# Patient Record
Sex: MALE | Race: AMERICAN INDIAN OR ALASKA NATIVE
[De-identification: names, ages, dates, MRNs, and addresses within clinical notes are randomized per-mention and may not be internally consistent; named-entity substitution may affect disease eponyms.]

---

## 2017-10-02 NOTE — HISTORY AND PHYSICAL REPORT
History of Present Illness


Date of admission: 


10/02/17 11:44





Chief complaint: 





I been having chest pain


History of present illness: 


35 YO Male with Obesity, Bipolar, Schizophrenia, Medication Noncompliance 

presents to ED for evaluation. Pt states that he has experienced pain in his 

chest. Pain began at 2000hrs while reclining in bed. Pain is 9/10, Sharp, 

constant, associated with shortness of breath, not worsened with exertion, or 

relieved with rest. Pt denies fever, chills, palpitations, productive cough, 

BRBPR, Syncope, NVD, unintentional weight loss, prolonged travel/immobility, 

Individual/Family History of DVT/PE, or recent ill contacts. 











Past History


Past Medical History: other (Obesity, Bipolar, Schizophrenia)


Past Surgical History: No surgical history, Other (reviewed)


Social history: single.  denies: smoking, alcohol abuse, prescription drug abuse


Family history: CAD, hypertension





Medications and Allergies


 Allergies











Allergy/AdvReac Type Severity Reaction Status Date / Time


 


No Known Allergies Allergy   Verified 10/01/17 22:30











 Home Medications











 Medication  Instructions  Recorded  Confirmed  Last Taken  Type


 


No Known Home Medications [No  10/02/17 10/02/17 Unknown History





Reported Home Medications]     














Review of Systems


Constitutional: no weight loss, no weight gain, no fever, no chills


Ears, nose, mouth and throat: no ear pain, no ear discharge, no tinnitis, no 

decreased hearing, no nose pain, no nasal congestion, no nasal discharge, no 

sinus pressure


Cardiovascular: chest pain, shortness of breath, no orthopnea, no palpitations, 

no rapid/irregular heart beat, no edema, no syncope, no lightheadedness


Respiratory: shortness of breath, no cough, no cough with sputum, no excessive 

sputum, no hemoptysis


Gastrointestinal: no abdominal pain, no nausea, no vomiting, no diarrhea, no 

constipation, no change in bowel habits


Genitourinary Male: no hematuria, no flank pain, no discharge, no urinary 

frequency, no urinary hesitancy, no nocturia, no incontinence, no erectile 

dysfunction


Rectal: no pain, no incontinence, no bleeding


Musculoskeletal: no neck stiffness, no neck pain, no shooting arm pain, no arm 

numbness/tingling, no low back pain, no shooting leg pain, no leg numbness/

tingling, no redness of joints


Integumentary: no rash, no pruritis, no redness, no sores, no wounds, no 

jaundice, no boils, no blisters


Neurological: no head injury, no transient paralysis, no paralysis, no weakness

, no parathesias, no numbness, no tingling, no seizures, no syncope, no tremors

, no ataxia, no lack of coordination


Psychiatric: no anxiety, no memory loss, no change in sleep habits, no sleep 

disturbances, no insomnia, no hypersomnia, no change in appetite, no change in 

libido


Endocrine: no cold intolerance, no heat intolerance, no polyphagia, no 

excessive thirst, no polydipsia, no polyuria, no nocturia, no excessive sweating


Hematologic/Lymphatic: no easy bruising, no easy bleeding


Allergic/Immunologic: no urticaria, no allergic rhinitis, no wheezing





Exam





- Constitutional


Vitals: 


 











Temp Pulse Resp BP Pulse Ox


 


 98.6 F   64   18   129/90   99 


 


 10/01/17 21:52  10/02/17 10:33  10/01/17 21:52  10/01/17 21:52  10/01/17 21:52











General appearance: Present: mild distress





- EENT


Eyes: Present: PERRL


ENT: hearing intact, clear oral mucosa





- Neck


Neck: Present: supple, normal ROM





- Respiratory


Respiratory effort: normal


Respiratory: bilateral: CTA





- Cardiovascular


Heart Sounds: Present: S1 & S2.  Absent: rub, click





- Extremities


Extremities: pulses symmetrical, No edema


Peripheral Pulses: within normal limits





- Abdominal


General gastrointestinal: Present: soft, non-tender, non-distended, normal 

bowel sounds


Male genitourinary: Present: normal





- Integumentary


Integumentary: Present: clear, warm, dry





- Musculoskeletal


Musculoskeletal: gait normal, strength equal bilaterally





- Psychiatric


Psychiatric: appropriate mood/affect, intact judgment & insight, agitated





- Neurologic


Neurologic: CNII-XII intact, moves all extremities





Results





- Labs


CBC & Chem 7: 


 10/01/17 22:09





 10/01/17 22:09





Assessment and Plan





- Patient Problems


(1) ACS (acute coronary syndrome)


Current Visit: Yes   Status: Acute   


Plan to address problem: 


Serial cardiac enzymes, ekg, telemetry, stress test, echo, cardiology consult, 

ddimer








(2) Obesity


Current Visit: Yes   Status: Acute   


Qualifiers: 


   Obesity type: O   Obesity classification: O   Serious obesity comorbidity 

presence: S   Body mass index: B 


Plan to address problem: 


balanced diet, increased physical activity, 








(3) GERD (gastroesophageal reflux disease)


Current Visit: Yes   Status: Acute   


Qualifiers: 


   Esophagitis presence: E 


Plan to address problem: 


PPI therapy,








(4) Bipolar 1 disorder


Current Visit: Yes   Status: Acute   


Plan to address problem: 


resume home medication, outpatient psychiatry F/U.








(5) DVT prophylaxis


Current Visit: Yes   Status: Acute

## 2017-10-02 NOTE — XRAY REPORT
AP CHEST:



HISTORY: chest pain



AP view of the chest demonstrates a normal mediastinal and

cardiac contour with clear lungs and normal bony and soft tissue

structures.



IMPRESSION:

Unremarkable AP chest.

## 2017-10-02 NOTE — CONSULTATION
History of Present Illness


Consult date: 10/02/17


Consult reason: chest pain


History of present illness: 





36yr old male who presented to the ED with complaints of chest pain. Chest pain 

reproducible with palpation. There is no shortness of breath or dizziness. He 

denies palpitations. ECG is a sinus rhythm with early repolarization 

abnormalities. Patient is admitted for rule out ACS and is planned for a 

thallium stress test ordered by the primary team. Cardiology consultation was 

requested.





Past History


Past Medical History: other (Bipolar, Schizophrenia)


Past Surgical History: No surgical history, Other (reviewed)


Social history: single.  denies: smoking, alcohol abuse, prescription drug abuse


Family history: CAD, hypertension





Medications and Allergies


 Allergies











Allergy/AdvReac Type Severity Reaction Status Date / Time


 


No Known Allergies Allergy   Verified 10/01/17 22:30











 Home Medications











 Medication  Instructions  Recorded  Confirmed  Last Taken  Type


 


No Known Home Medications [No  10/02/17 10/02/17 Unknown History





Reported Home Medications]     











Active Meds: 


Active Medications





Sodium Chloride (Sodium Chloride Flush Syringe 10 Ml)  10 ml IV PRN PRN


   PRN Reason: LINE FLUSH











Physical Examination


 Vital Signs











Temp Pulse Resp BP Pulse Ox


 


 98.6 F   56 L  18   129/90   98 


 


 10/01/17 21:49  10/01/17 21:49  10/01/17 21:49  10/01/17 21:49  10/01/17 21:49











General appearance: no acute distress


HEENT: Positive: PERRL


Neck: Positive: trachea midline


Cardiac: Positive: Reg Rate and Rhythm


Lungs: Positive: Decreased Breath Sounds


Neuro: Positive: Grossly Intact





Results





 10/01/17 22:09





 10/01/17 22:09





Assessment and Plan





- Patient Problems


(1) Chest pain


Current Visit: Yes   Status: Acute

## 2017-10-03 NOTE — TREADMILL REPORT
TREADMILL STRESS TEST REPORT



REASON FOR TEST:

Chest pain.



The patient exercised for 9 minutes of a Asim protocol, completing stage 3 and

achieving 10 mets.  Peak heart rate was 130 beats per minute.  The test was

stopped for fatigue.  There was no chest pain.



Baseline ECG was sinus rhythm.  With exercise, there were no ST changes of

ischemia.  No significant dysrhythmias were noted.



CONCLUSION:

1.  Good exercise capacity.

2.  No chest pain.

3.  No ST changes of ischemia.

4.  No significant dysrhythmias.



This is a negative exercise ECG test.





DD: 10/03/2017 10:04

DT: 10/03/2017 14:27

JOB# [id number]  [id number]

CA/JAYDEN

## 2017-10-03 NOTE — EVENT NOTE
Date: 10/03/17





Cardiac stress test performed today, patient exercised for 9 minutes of Asim 

protocol, no chest pain and no ST changes, negative stress test.


Echocardiogram done was also unremarkable, left ventricular ejection fraction 55

-60%, no significant valvular lesions.


Cardiac status is stable for discharge, patient should follow-up in our office 

within one week of discharge.

## 2017-10-03 NOTE — DISCHARGE SUMMARY
Providers





- Providers


Date of Admission: 


10/02/17 11:44





Date of discharge: 10/03/17


Attending physician: 


MEDHAT CRUZ





 





10/02/17


Consult to Cardiac Rehabilitation [CONS] Routine 


   Reason For Exam: Phase I





10/02/17 12:54


Consult to Cardiology [CONS] Routine 


   Consulting Provider: DIVYA RAO


   Reason For Exam: acs











Primary care physician: 


PRIMARY CARE MD








Hospitalization


Condition: Fair


Disposition: DC-01 TO HOME OR SELFCARE





- Discharge Diagnoses


(1) Musculoskeletal chest pain


Status: Acute   





Exam





- Constitutional


Vitals: 


 











Temp Pulse Resp BP Pulse Ox


 


 98.8 F   64   18   117/71   99 


 


 10/03/17 13:20  10/03/17 13:20  10/03/17 13:20  10/03/17 13:20  10/03/17 14:46














Plan


Activity: no restrictions


Diet: low fat, low cholesterol, low salt


Additional Instructions: 1.Follow up with PCP in 1 week


Follow up with: 


PRIMARY CARE,MD [Primary Care Provider] - 3-5 Days


Prescriptions: 


Ibuprofen [Motrin 400 MG tab] 400 mg PO Q8H PRN #20 tablet


 PRN Reason: Pain

## 2017-10-08 NOTE — EMERGENCY DEPARTMENT REPORT
ED General Adult HPI





- General


Chief complaint: Chest Pain


Stated complaint: CP; H/A


Time Seen by Provider: 10/08/17 06:22


Source: patient, RN notes reviewed, old records reviewed


Mode of arrival: Ambulatory


Limitations: No Limitations





- History of Present Illness


Initial comments: 





This is a 36-year-old male, patient is previously unknown to this provider, has 

a past medical history of obesity, bipolar, schizophrenia.  Patient recently 

admitted to the hospital for chest pain, had a negative exercise stress test, 

seen in consultation with cardiology, they recommended outpatient follow-up.  

Patient also has an echocardiogram demonstrated an appropriate ejection 

fraction.





During his previous hospitalization, there were no documented episodes of 

tachycardia.  The patient presents to the ER with 2 complaints.  His first 

complaint is chest pain.  Chest pain is central, it does not radiate to the back

, arms or neck.  There is no vomiting, there is no diaphoresis, there is no 

shortness of breath.  There is no leg pain, there is no leg swelling.  Patient 

indicates the chest pain has been on and off since the beginning of October.





The patient also complains of syncope/loss of consciousness.  He reports this 

was associated with a headache.  The headache is sudden in onset, and fairly 

rapid onset in nature.  The patient indicates he has not had a headache like 

this in the past.  He thinks is the worst headache of his life.  There is no 

neck pain.  There is no neck stiffness.  The patient is having difficulty 

constructing a cogent time line relating his possible syncope to his headache.  

He thinks they're closely associated, but he is not certain.























-: Gradual


Location: head, chest


Severity scale (0 -10): 8


Quality: aching


Consistency: intermittent


Improves with: none


Worsens with: none


Associated Symptoms: chest pain, headaches, syncope, weakness





- Related Data


 Previous Rx's











 Medication  Instructions  Recorded  Last Taken  Type


 


Famotidine [Pepcid] 20 mg PO BID #60 tablet 10/03/17 Unknown Rx


 


Ibuprofen [Motrin 400 MG tab] 400 mg PO Q8H PRN #20 tablet 10/03/17 Unknown Rx


 


Butalb/Acetamin/Caff -40 1 tab PO Q6HR PRN #15 tab 10/08/17 Unknown Rx





[Fioricet]    











 Allergies











Allergy/AdvReac Type Severity Reaction Status Date / Time


 


No Known Allergies Allergy   Verified 10/01/17 22:30














ED Review of Systems


ROS: 


Stated complaint: CP; H/A


Other details as noted in HPI





Constitutional: malaise.  denies: fever


Eyes: denies: vision change


ENT: denies: epistaxis


Respiratory: denies: cough


Cardiovascular: chest pain, syncope


Gastrointestinal: denies: abdominal pain


Genitourinary: denies: dysuria


Musculoskeletal: denies: back pain


Skin: denies: lesions


Neurological: headache


Psychiatric: denies: homicidal thoughts, suicidal thoughts





ED Past Medical Hx





- Past Medical History


Previous Medical History?: Yes


Hx Psychiatric Treatment:  (bipolar/schizo not taking meds)





- Surgical History


Past Surgical History?: No





- Social History


Smoking Status: Never Smoker


Substance Use Type: None





- Medications


Home Medications: 


 Home Medications











 Medication  Instructions  Recorded  Confirmed  Last Taken  Type


 


Famotidine [Pepcid] 20 mg PO BID #60 tablet 10/03/17  Unknown Rx


 


Ibuprofen [Motrin 400 MG tab] 400 mg PO Q8H PRN #20 tablet 10/03/17  Unknown Rx


 


Butalb/Acetamin/Caff -40 1 tab PO Q6HR PRN #15 tab 10/08/17  Unknown Rx





[Fioricet]     














ED Physical Exam





- General


Limitations: No Limitations


General appearance: alert, in no apparent distress





- Head


Head exam: Present: atraumatic, normocephalic





- Eye


Eye exam: Present: normal appearance, PERRL, EOMI, other (visual acuity intact 

to finger counting, color perception, reading at a close distance).  Absent: 

nystagmus





- ENT


ENT exam: Present: normal exam, normal orophraynx, mucous membranes moist, 

normal external ear exam





- Neck


Neck exam: Present: normal inspection, full ROM.  Absent: tenderness, 

meningismus





- Respiratory


Respiratory exam: Present: normal lung sounds bilaterally.  Absent: respiratory 

distress, wheezes, rales, rhonchi, stridor, chest wall tenderness, prolonged 

expiratory





- Cardiovascular


Cardiovascular Exam: Present: regular rate, normal rhythm, normal heart sounds.

  Absent: bradycardia, tachycardia, irregular rhythm, systolic murmur, 

diastolic murmur, rubs, gallop





- GI/Abdominal


GI/Abdominal exam: Present: soft, normal bowel sounds.  Absent: distended, 

tenderness, guarding, rebound, rigid, pulsatile mass





- Rectal


Rectal exam: Present: deferred





- Extremities Exam


Extremities exam: Present: normal inspection, full ROM, normal capillary 

refill.  Absent: pedal edema, joint swelling, calf tenderness





- Back Exam


Back exam: Present: normal inspection, full ROM.  Absent: tenderness, CVA 

tenderness (R), CVA tenderness (L), muscle spasm, paraspinal tenderness, 

vertebral tenderness





- Neurological Exam


Neurological exam: Present: alert, oriented X3, normal gait, other (Extraocular 

movements intact.  Tongue midline.  No facial droop.  Facial sensation intact 

to light touch in the V1, V2, V3 distribution bilaterally.  5 and 5 strength in 

4 extremities..  Sensation is intact to light touch in 4 extremities.).  Absent

: motor sensory deficit





- Psychiatric


Psychiatric exam: Present: normal affect, normal mood





- Skin


Skin exam: Present: warm, dry, intact, normal color.  Absent: rash





ED Course


 Vital Signs











  10/07/17 10/08/17 10/08/17





  23:51 00:15 04:35


 


Temperature 97.8 F 97.8 F 97.7 F


 


Pulse Rate 62 62 53 L


 


Respiratory 18 17 18





Rate   


 


Blood Pressure 124/72 124/72 


 


Blood Pressure   122/78





[Left]   


 


Blood Pressure   





[Right]   


 


O2 Sat by Pulse 98 99 98





Oximetry   














  10/08/17 10/08/17 10/08/17





  05:15 05:35 05:46


 


Temperature   


 


Pulse Rate 46 L 54 L 54 L


 


Respiratory 16 17 20





Rate   


 


Blood Pressure  118/80 118/80


 


Blood Pressure 119/75  





[Left]   


 


Blood Pressure   





[Right]   


 


O2 Sat by Pulse 98 94 95





Oximetry   














  10/08/17 10/08/17 10/08/17





  06:00 06:10 06:16


 


Temperature   


 


Pulse Rate 51 L 66 49 L


 


Respiratory 15  18





Rate   


 


Blood Pressure 118/80  107/70


 


Blood Pressure   





[Left]   


 


Blood Pressure   





[Right]   


 


O2 Sat by Pulse 99  96





Oximetry   














  10/08/17 10/08/17 10/08/17





  06:30 06:46 07:04


 


Temperature   


 


Pulse Rate 57 L 67 60


 


Respiratory 11 L  14





Rate   


 


Blood Pressure 107/70 107/70 107/70


 


Blood Pressure   





[Left]   


 


Blood Pressure   





[Right]   


 


O2 Sat by Pulse  97 95





Oximetry   














  10/08/17 10/08/17 10/08/17





  07:05 07:16 07:30


 


Temperature 98.3 F  


 


Pulse Rate 63 57 L 54 L


 


Respiratory 14 18 36 H





Rate   


 


Blood Pressure  122/77 122/77


 


Blood Pressure   





[Left]   


 


Blood Pressure 122/77  





[Right]   


 


O2 Sat by Pulse 95 98 97





Oximetry   














  10/08/17 10/08/17 10/08/17





  07:46 08:00 08:16


 


Temperature   


 


Pulse Rate 60 57 L 56 L


 


Respiratory 14 17 13





Rate   


 


Blood Pressure 111/71 111/71 123/76


 


Blood Pressure   





[Left]   


 


Blood Pressure   





[Right]   


 


O2 Sat by Pulse 98 97 97





Oximetry   














  10/08/17 10/08/17 10/08/17





  08:30 08:46 09:00


 


Temperature   


 


Pulse Rate 55 L 60 57 L


 


Respiratory 19 16 17





Rate   


 


Blood Pressure 123/76 134/80 134/80


 


Blood Pressure   





[Left]   


 


Blood Pressure   120/82





[Right]   


 


O2 Sat by Pulse 95 96 99





Oximetry   














  10/08/17 10/08/17 10/08/17





  09:16 09:30 09:46


 


Temperature   


 


Pulse Rate 51 L 58 L 54 L


 


Respiratory 15 16 18





Rate   


 


Blood Pressure 120/82 120/82 121/86


 


Blood Pressure   





[Left]   


 


Blood Pressure  121/86 





[Right]   


 


O2 Sat by Pulse 97 97 99





Oximetry   














  10/08/17 10/08/17 10/08/17





  10:40 10:46 11:00


 


Temperature   


 


Pulse Rate 64 56 L 56 L


 


Respiratory 16 21 17





Rate   


 


Blood Pressure 121/86 121/86 117/70


 


Blood Pressure   





[Left]   


 


Blood Pressure   





[Right]   


 


O2 Sat by Pulse 100 98 





Oximetry   














  10/08/17 10/08/17 10/08/17





  11:16 11:30 11:46


 


Temperature   


 


Pulse Rate 64 55 L 54 L


 


Respiratory 13 12 15





Rate   


 


Blood Pressure 117/70 113/62 113/62


 


Blood Pressure   





[Left]   


 


Blood Pressure   





[Right]   


 


O2 Sat by Pulse 99 96 97





Oximetry   














- Reevaluation(s)


Reevaluation #1: 





10/08/17 11:42


Patient resting comfortably.  Patient reevaluated multiple times by myself all 

in the department.  CSF results not consistent with subarachnoid, repeat 

neurologic examination unremarkable, patient will be discharged at this time, 

return precautions are reviewed.





ED Medical Decision Making





- Lab Data


Result diagrams: 


 10/08/17 00:23





 10/08/17 00:23








 Vital Signs











  10/07/17 10/08/17 10/08/17





  23:51 00:15 04:35


 


Temperature 97.8 F 97.8 F 97.7 F


 


Pulse Rate 62 62 53 L


 


Respiratory 18 17 18





Rate   


 


Blood Pressure 124/72 124/72 


 


Blood Pressure   122/78





[Left]   


 


Blood Pressure   





[Right]   


 


O2 Sat by Pulse 98 99 98





Oximetry   














  10/08/17 10/08/17 10/08/17





  05:15 05:35 05:46


 


Temperature   


 


Pulse Rate 46 L 54 L 54 L


 


Respiratory 16 17 20





Rate   


 


Blood Pressure  118/80 118/80


 


Blood Pressure 119/75  





[Left]   


 


Blood Pressure   





[Right]   


 


O2 Sat by Pulse 98 94 95





Oximetry   














  10/08/17 10/08/17 10/08/17





  06:00 06:10 07:05


 


Temperature   98.3 F


 


Pulse Rate 51 L 66 63


 


Respiratory 15  14





Rate   


 


Blood Pressure 118/80  


 


Blood Pressure   





[Left]   


 


Blood Pressure   122/77





[Right]   


 


O2 Sat by Pulse 99  95





Oximetry   














  10/08/17 10/08/17





  09:00 09:30


 


Temperature  


 


Pulse Rate 52 L 57 L


 


Respiratory 18 20





Rate  


 


Blood Pressure  


 


Blood Pressure  





[Left]  


 


Blood Pressure 120/82 121/86





[Right]  


 


O2 Sat by Pulse 96 96





Oximetry  














 Lab Results











  10/08/17 10/08/17 10/08/17 Range/Units





  00:23 00:23 03:57 


 


WBC  5.4    (4.5-11.0)  K/mm3


 


RBC  5.14 H    (3.65-5.03)  M/mm3


 


Hgb  14.8    (11.8-15.2)  gm/dl


 


Hct  44.1    (35.5-45.6)  %


 


MCV  86    (84-94)  fl


 


MCH  29    (28-32)  pg


 


MCHC  34    (32-34)  %


 


RDW  14.5    (13.2-15.2)  %


 


Plt Count  227    (140-440)  K/mm3


 


Lymph % (Auto)  54.0 H    (13.4-35.0)  %


 


Mono % (Auto)  6.2    (0.0-7.3)  %


 


Eos % (Auto)  2.5    (0.0-4.3)  %


 


Baso % (Auto)  1.3    (0.0-1.8)  %


 


Lymph #  2.9    (1.2-5.4)  K/mm3


 


Mono #  0.3    (0.0-0.8)  K/mm3


 


Eos #  0.1    (0.0-0.4)  K/mm3


 


Baso #  0.1    (0.0-0.1)  K/mm3


 


Seg Neutrophils %  36.0 L    (40.0-70.0)  %


 


Seg Neutrophils #  1.9    (1.8-7.7)  K/mm3


 


PT     (12.2-14.9)  Sec.


 


INR     (0.87-1.13)  


 


APTT     (24.2-36.6)  Sec.


 


D-Dimer     (0-234)  ng/mlDDU


 


Sodium   142   (137-145)  mmol/L


 


Potassium   3.8   (3.6-5.0)  mmol/L


 


Chloride   103.4   ()  mmol/L


 


Carbon Dioxide   25   (22-30)  mmol/L


 


Anion Gap   17   mmol/L


 


BUN   17   (9-20)  mg/dL


 


Creatinine   0.7 L   (0.8-1.5)  mg/dL


 


Estimated GFR   > 60   ml/min


 


BUN/Creatinine Ratio   24   %


 


Glucose   96   ()  mg/dL


 


Calcium   9.6   (8.4-10.2)  mg/dL


 


Troponin T   < 0.010  < 0.010  (0.00-0.029)  ng/mL














  10/08/17 10/08/17 Range/Units





  06:01 06:38 


 


WBC    (4.5-11.0)  K/mm3


 


RBC    (3.65-5.03)  M/mm3


 


Hgb    (11.8-15.2)  gm/dl


 


Hct    (35.5-45.6)  %


 


MCV    (84-94)  fl


 


MCH    (28-32)  pg


 


MCHC    (32-34)  %


 


RDW    (13.2-15.2)  %


 


Plt Count    (140-440)  K/mm3


 


Lymph % (Auto)    (13.4-35.0)  %


 


Mono % (Auto)    (0.0-7.3)  %


 


Eos % (Auto)    (0.0-4.3)  %


 


Baso % (Auto)    (0.0-1.8)  %


 


Lymph #    (1.2-5.4)  K/mm3


 


Mono #    (0.0-0.8)  K/mm3


 


Eos #    (0.0-0.4)  K/mm3


 


Baso #    (0.0-0.1)  K/mm3


 


Seg Neutrophils %    (40.0-70.0)  %


 


Seg Neutrophils #    (1.8-7.7)  K/mm3


 


PT   14.0  (12.2-14.9)  Sec.


 


INR   1.03  (0.87-1.13)  


 


APTT   27.4  (24.2-36.6)  Sec.


 


D-Dimer   < 135.00  (0-234)  ng/mlDDU


 


Sodium    (137-145)  mmol/L


 


Potassium    (3.6-5.0)  mmol/L


 


Chloride    ()  mmol/L


 


Carbon Dioxide    (22-30)  mmol/L


 


Anion Gap    mmol/L


 


BUN    (9-20)  mg/dL


 


Creatinine    (0.8-1.5)  mg/dL


 


Estimated GFR    ml/min


 


BUN/Creatinine Ratio    %


 


Glucose    ()  mg/dL


 


Calcium    (8.4-10.2)  mg/dL


 


Troponin T  < 0.010   (0.00-0.029)  ng/mL














- EKG Data


-: EKG Interpreted by Me


EKG shows normal: sinus rhythm, axis, intervals, QRS complexes, ST-T waves





- EKG Data





10/08/17 10:30


EKG #1 demonstrates sinus, 57 bpm, normal axis, normal intervals, not 

morphologically consistent with STEMI, was unchanged from prior EKG October 2017.





EKG #2 is unchanged from prior, not morphologically consistent with STEMI, 

there is a suggestion of ST elevation, possibly consistent with pericarditis, 

however this is unchanged when compared to prior EKG.  It is not consistent 

with STEMI.





- Radiology Data


Radiology results: report reviewed, image reviewed





Noncontrast CT scan of the brain is negative.





X-ray of the chest is negative.











- Medical Decision Making





Differential diagnosis: Pericarditis, myocarditis, pulmonary embolus, acute 

coronary syndrome, pneumonia, structural cardiac disease, pulmonary embolus, 

migraine headache, tension headache, cluster headache, subarachnoid hemorrhage





Assessment and plan: 36-year-old male with headache, possible syncope, chest 

pain.  The patient is afebrile, with reassuring vital signs, he is clinically 

sober, with a GCS of 15, and an NIH score of 0.  Patient low risk by well's 

criteria, low risk by CASSANDRA score, low risk by heart score, recently had an 

unremarkable echocardiogram and exercise stress test, and was cleared by 

cardiology.





Patient also had a negative d-dimer, low risk by perc.





Noncontrast CT scan of the brain, patient has been observed in the ER 4 hours 

without convulsive activity, syncope, arrhythmia or loss of consciousness.  Low 

suspicion for subarachnoid hemorrhage, but given history, we'll obtain 

fluoroscopically guided lumbar puncture to exclude subarachnoid hemorrhage.  

Lumbar puncture is pending at this time.


Critical care attestation.: 


If time is entered above; I have spent that time in minutes in the direct care 

of this critically ill patient, excluding procedure time.








ED Disposition


Clinical Impression: 


 Chest pain, Headache, History of syncope





Disposition: DC-01 TO HOME OR SELFCARE


Is pt being admited?: No


Does the pt Need Aspirin: No


Condition: Stable


Instructions:  Chest Pain (ED)


Additional Instructions: 


Follow up with a primary care doctor or cardiologist within the next 2 weeks.  

Do not drive a car or operate motor vehicles until cleared by either primary 

care or cardiology.    Return to the ER right away with new pain, worsened pain

, migration of pain, fevers, chills, lethargy, irritability, projectile vomiting

, change in mental status, inability to tolerate liquid feeds.  Please note 

that headache can be worsened by spinal tap, this is normal and expected, 

headache may last for up to 2-3 weeks.  Take a headache medication as directed.

  When taking headache medication, do not drive, consume alcohol, or make 

important decisions.











Prescriptions: 


Butalb/Acetamin/Caff -40 [Fioricet] 1 tab PO Q6HR PRN #15 tab


 PRN Reason: Headache


Referrals: 


DIVYA RAO MD [Staff Physician] - 3-5 Days


PRIMARY CARE,MD [Primary Care Provider] - 3-5 Days


GRIFFIN DOWNEY MD [Staff Physician] - 3-5 Days


TAN HENRY MD [Staff Physician] - 3-5 Days

## 2017-10-08 NOTE — FLUOROSCOPY REPORT
FLUOROSCOPY LUMBAR PUNCTURE



History: Headache, subarachnoid hemorrhage.



Description of procedure: Informed consent was obtained. Sterile 

technique was utilized. 1% lidocaine for skin anesthesia. Using 

fluoroscopy guidance, lumbar puncture was performed at the L3-4 level. 

There was spontaneous return of clear CSF. 4 tubes of CSF fluid was 

collected. No complications. Samples were sent to lab per the ordering 

physician's request.



Impression: Successful fluoroscopy-guided lumbar puncture at L3-4.

## 2017-10-08 NOTE — CAT SCAN REPORT
FINAL REPORT



PROCEDURE:  CT HEAD/BRAIN WO CON



TECHNIQUE:  Computerized tomography of the head was performed

without contrast material. 



HISTORY:  ha passing out ? sah 



COMPARISON:  No prior studies are available for comparison.



FINDINGS:  

Skull and scalp: Normal.



Paranasal sinuses: There is a 2 centimeter dentigerous cyst in

the right maxillary sinus. There is no sinusitis..



Ventricles and subarachnoid spaces: Normal.



Cerebrum: No evidence of hemorrhage, acute infarction or mass .



Cerebellum and brainstem: No evidence of hemorrhage, acute

infarction or mass.



Vasculature: Normal.



Comments: None.



IMPRESSION:  

There is no skull fracture or intracranial hemorrhage.

## 2017-10-08 NOTE — PROCEDURE NOTE
Date of procedure: 10/08/17


Pre-op diagnosis: headache


Post-op diagnosis: same


Procedure: 





Lumbar puncture


Anesthesia: local


Surgeon: KLAUDIA GLASS


Estimated blood loss: none


Pathology: list (4 tubes of CSF)


Specimen disposition: to lab


Condition: stable


Disposition: other (back to ER)

## 2018-02-14 NOTE — EMERGENCY DEPARTMENT REPORT
HPI





- General


Chief Complaint: Upper Respiratory Infection


Time Seen by Provider: 02/14/18 22:37





- HPI


HPI: 


Patient is a 37-year-old male with no prior medical history who presents to the 

ED stating  that he was  in a group session earlier today when he started 

shaking.  Patient states he was shaking for less than a minute and was given 

some water later on by his group session teacher.  Patient states that this 

remembers the incident but was told what happened by his groupmates.  Patient 

states he was not on the floor for nor did he sustain any trauma.


he states he takes risperidone for his bipolar disorder and takes no other 

medications.  Patient states he was brought in for evaluation of flulike 

symptoms.  Patient states he's had dry intermittent cough x 2 days but denies  

runny nose and other symptoms.


He denies any history of seizures, syncopal episodes.  Patient states he did 

not have any trauma.  He denies fevers associated chills/nausea/vomiting/

abdominal or chest pain.








ED Past Medical Hx





- Past Medical History


Previous Medical History?: No


Hx Psychiatric Treatment:  (bipolar/schizo not taking meds)





- Surgical History


Past Surgical History?: No





- Social History


Smoking Status: Never Smoker


Substance Use Type: None





- Medications


Home Medications: 


 Home Medications











 Medication  Instructions  Recorded  Confirmed  Last Taken  Type


 


Famotidine [Pepcid] 20 mg PO BID #60 tablet 10/03/17  Unknown Rx


 


Butalb/Acetamin/Caff -40 1 tab PO Q6HR PRN #15 tab 10/08/17  Unknown Rx





[Fioricet]     


 


Amoxicillin/K Clav Tab [Augmentin 1 tab PO Q12HR #20 tab 11/26/17  Unknown Rx





875 mg]     


 


Erythromycin [Erythromycin Ophth 1 applic OP BID #1 tube 11/26/17  Unknown Rx





Oint]     


 


Ibuprofen [Motrin] 800 mg PO Q8HR PRN #30 tablet 11/26/17  Unknown Rx


 


Ibuprofen [Motrin 400 MG tab] 400 mg PO Q8H PRN #20 tablet 02/15/18  Unknown Rx


 


guaiFENesin [Robitussin] 100 mg PO Q8H #80 ml 02/15/18  Unknown Rx














ED Review of Systems


ROS: 


Stated complaint: FLU LIKE SYMPTOMS


Other details as noted in HPI





Constitutional: denies: chills, fever


Eyes: denies: eye pain, eye discharge, vision change


ENT: denies: ear pain, throat pain


Respiratory: denies: cough, shortness of breath, wheezing


Cardiovascular: denies: chest pain, palpitations


Endocrine: no symptoms reported


Gastrointestinal: denies: abdominal pain, nausea, vomiting, diarrhea


Genitourinary: denies: urgency, dysuria


Musculoskeletal: denies: back pain, joint swelling, arthralgia


Skin: denies: rash, lesions


Neurological: denies: headache, weakness, numbness, paresthesias, confusion


Psychiatric: denies: anxiety, depression


Hematological/Lymphatic: denies: easy bleeding, easy bruising





Physical Exam





- Physical Exam


Vital Signs: 


 Vital Signs











  02/14/18





  15:14


 


Temperature 99.0 F


 


Pulse Rate 92 H


 


Respiratory 18





Rate 


 


Blood Pressure 91/61


 


O2 Sat by Pulse 97





Oximetry 











Physical Exam: 





GENERAL: Alert and oriented x3, no apparent distress, Normal Gait, atraumatic.


HEAD: Head is normocephalic and a-traumatic.


EYES: Extra ocular muscles are intact. Pupils are equal, round, and reactive to 

light and accommodation.





NECK: Supple. Non edematous,  No lymphadenopathy or thyromegaly.  No C-spine 

tenderness


LUNGS: Symetrical with respiration, No wheezing, no rales or crackles, CTAB.


HEART:  S1, S2 present, regular rate and rhythm without murmur, no rubs, no 

gallops. Non tender to palpation





BACK: Full range of motion, no spinal tenderness, nontender to palpation.





EXTREMITIES/MUSCULOSKELETAL: No cyanosis, clubbing, rash, lesions or edema. 

Full ROM bilaterally. UE/LE Pulses 2+ bilaterally.  LE and UE 5+ strength 

bilaterally, all joints are intact bilaterally.


NEUROLOGIC:  The patient is cooperative with no focal neurologic deficits. . 

Normal speech.  Normal sensation in bilateral upper and lower extremities,  No 

loss of sensation,  No facial droop, 


PSYCHIATRIC: Mood is congruent with affect, denies suicidal or homicidal 

ideations.


SKIN:  Warm and dry, No lesions, No ulceration or induration present.











ED Course


 Vital Signs











  02/14/18





  15:14


 


Temperature 99.0 F


 


Pulse Rate 92 H


 


Respiratory 18





Rate 


 


Blood Pressure 91/61


 


O2 Sat by Pulse 97





Oximetry 














ED Medical Decision Making





- Radiology Data


Radiology results: report reviewed, image reviewed





FINAL REPORT 





EXAM: CT HEAD/BRAIN WO CON 





HISTORY: possible seizure 





TECHNIQUE: Routine axial imaging was obtained of the brain 


without IV contrast. Comparison is made to the study of 


10/08/2017. 





FINDINGS: 


The ventricular system is appropriate in size and is symmetric. 


There is no evidence of acute stroke or hemorrhage. The basal 


cisterns appear normal. The visualized sinuses are clear. The 


mastoid air cells are well pneumatized. The calvarium appears 


intact. 





IMPRESSION: 


Normal exam. 











Transcribed By: RB 


Dictated By: DUDLEY CONTRERAS MD 


Electronically Authenticated By: DUDLEY CONTRERAS MD 


Signed Date/Time: 02/14/18 2006 








- Medical Decision Making


37-year-old male presents for possible seizure episode.


ED course: Patient received Motrin for rays showed a back pain.


Patient has no history of seizures.  Patient is neurologically intact has no 

neuro deficits.


CT scan of the head ordered CT scan shows no abnormality


I discussed the patient I'll refer him to a neurologist who can further assess 

him for seizures.


Patient states he was burning for flulike symptoms but denies fever, coughing, 

chest pain, runny nose.  I discussed the patient to take some vitamin C and 

saline from sick contacts.  Patient also states he received a flu vaccination 

at the beginning of the season.


Patient states in complete sentences, understands instructions given has no 

neurological deficits.


His vital signs are normal .





Critical care attestation.: 


If time is entered above; I have spent that time in minutes in the direct care 

of this critically ill patient, excluding procedure time.








ED Disposition


Clinical Impression: 


 Bronchitis





Disposition: DC-01 TO HOME OR SELFCARE


Is pt being admited?: No


Does the pt Need Aspirin: No


Condition: Stable


Instructions:  Chronic Bronchitis (ED)


Additional Instructions: 


Make sure to follow up with the primary care physician as discussed.


Take all your medications as you've been prescribed.


If you have any worsening symptoms or develop new symptoms please return to ED 

immediately.


Prescriptions: 


guaiFENesin [Robitussin] 100 mg PO Q8H #80 ml


Ibuprofen [Motrin 400 MG tab] 400 mg PO Q8H PRN #20 tablet


 PRN Reason: Pain


Referrals: 


PRIMARY CARE,MD [Primary Care Provider] - 3-5 Days


DUANE ANTONIO MD [Staff Physician] - 3-5 Days


[facility] [Outside] - 3-5 Days


[facility] [Outside] - 3-5 Days


[facility] [Outside] - 3-5 Days


Forms:  Work/School Release Form(ED)


Time of Disposition: 00:44

## 2018-02-15 NOTE — CAT SCAN REPORT
FINAL REPORT



EXAM:  CT HEAD/BRAIN WO CON



HISTORY:  possible seizure 



TECHNIQUE:  Routine axial imaging was obtained of the brain

without IV contrast. Comparison is made to the study of

10/08/2017.



FINDINGS:  

The ventricular system is appropriate in size and is symmetric.

There is no evidence of acute stroke or hemorrhage. The basal

cisterns appear normal. The visualized sinuses are clear. The

mastoid air cells are well pneumatized. The calvarium appears

intact.



IMPRESSION:  

Normal exam.

## 2018-04-08 ENCOUNTER — HOSPITAL ENCOUNTER (EMERGENCY)
Dept: HOSPITAL 5 - ED | Age: 38
Discharge: HOME | End: 2018-04-08
Payer: MEDICAID

## 2018-04-08 VITALS — SYSTOLIC BLOOD PRESSURE: 116 MMHG | DIASTOLIC BLOOD PRESSURE: 59 MMHG

## 2018-04-08 DIAGNOSIS — R07.89: Primary | ICD-10-CM

## 2018-04-08 LAB
BASOPHILS # (AUTO): 0 K/MM3 (ref 0–0.1)
BASOPHILS NFR BLD AUTO: 0.4 % (ref 0–1.8)
BUN SERPL-MCNC: 11 MG/DL (ref 9–20)
BUN/CREAT SERPL: 16 %
CALCIUM SERPL-MCNC: 9 MG/DL (ref 8.4–10.2)
EOSINOPHIL # BLD AUTO: 0.2 K/MM3 (ref 0–0.4)
EOSINOPHIL NFR BLD AUTO: 3.6 % (ref 0–4.3)
HCT VFR BLD CALC: 41.4 % (ref 35.5–45.6)
HEMOLYSIS INDEX: 11
HGB BLD-MCNC: 13.8 GM/DL (ref 11.8–15.2)
LYMPHOCYTES # BLD AUTO: 2.1 K/MM3 (ref 1.2–5.4)
LYMPHOCYTES NFR BLD AUTO: 47.4 % (ref 13.4–35)
MCH RBC QN AUTO: 29 PG (ref 28–32)
MCHC RBC AUTO-ENTMCNC: 33 % (ref 32–34)
MCV RBC AUTO: 86 FL (ref 84–94)
MONOCYTES # (AUTO): 0.3 K/MM3 (ref 0–0.8)
MONOCYTES % (AUTO): 7.7 % (ref 0–7.3)
PLATELET # BLD: 210 K/MM3 (ref 140–440)
RBC # BLD AUTO: 4.8 M/MM3 (ref 3.65–5.03)

## 2018-04-08 PROCEDURE — 71046 X-RAY EXAM CHEST 2 VIEWS: CPT

## 2018-04-08 PROCEDURE — 82550 ASSAY OF CK (CPK): CPT

## 2018-04-08 PROCEDURE — 84484 ASSAY OF TROPONIN QUANT: CPT

## 2018-04-08 PROCEDURE — 85025 COMPLETE CBC W/AUTO DIFF WBC: CPT

## 2018-04-08 PROCEDURE — 82553 CREATINE MB FRACTION: CPT

## 2018-04-08 PROCEDURE — 36415 COLL VENOUS BLD VENIPUNCTURE: CPT

## 2018-04-08 PROCEDURE — 80048 BASIC METABOLIC PNL TOTAL CA: CPT

## 2018-04-08 PROCEDURE — 99285 EMERGENCY DEPT VISIT HI MDM: CPT

## 2018-04-08 PROCEDURE — 93005 ELECTROCARDIOGRAM TRACING: CPT

## 2018-04-08 PROCEDURE — 85379 FIBRIN DEGRADATION QUANT: CPT

## 2018-04-08 PROCEDURE — 93010 ELECTROCARDIOGRAM REPORT: CPT

## 2018-04-08 NOTE — XRAY REPORT
FINAL REPORT



EXAM:  XR CHEST ROUTINE 2V



HISTORY:  chest pain 



TECHNIQUE:  Two view chest PA and lateral 



PRIORS:  None.



FINDINGS:  

Cardiac and mediastinal contours are unremarkable.  No focal

pulmonary infiltrate is identified.  No pleural fluid collection

seen. Pulmonary vasculature is unremarkable. 



IMPRESSION:  

Negative two-view chest

## 2018-04-08 NOTE — EMERGENCY DEPARTMENT REPORT
HPI





- General


Chief Complaint: Chest Pain


Time Seen by Provider: 18 17:01





- \[facility] [place] [facility]\""


HPI: 





Mao 21





The patient is a 37-year-old male presents with chief complaint chest pain.  

The patient says for the past several hours is constant soreness/burning to the 

left chest that has been constant.  Patient denies shortness of breath or nausea

/vomiting.  The patient gives his pain score of 6-7/10.  Of note the patient 

had a normal stress test 9 days ago.  Patient states he's never had a cardiac 

catheterization








Location: Chest


Duration: Hours


Quality: Soreness/burning


Severity: 6-7/10


Modifying factors: [see above]


Context: [see above]


Mode of transportation: Unknown





ED Past Medical Hx





- Past Medical History


Hx Diabetes: No


Hx Psychiatric Treatment: Yes (bipolar/schizo)





- Surgical History


Past Surgical History?: No





- Family History


Family history: no significant





- Social History


Smoking Status: Never Smoker


Substance Use Type: None (denies illicit drug use)





- Medications


Home Medications: 


 Home Medications











 Medication  Instructions  Recorded  Confirmed  Last Taken  Type


 


risperiDONE [RisperDAL] 1 mg PO DAILY 18 Unknown History


 


Famotidine [Pepcid] 20 mg PO BID #30 tablet 18  Unknown Rx


 


Famotidine [Pepcid] 20 mg PO BID #20 tablet 18  Unknown Rx


 


traMADol [Ultram] 50 mg PO Q6HR PRN #14 tablet 18  Unknown Rx














ED Review of Systems


ROS: 


Stated complaint: CHEST PAIN


Other details as noted in HPI





Constitutional: diaphoresis


Respiratory: denies: shortness of breath


Cardiovascular: chest pain


Gastrointestinal: denies: nausea, vomiting





Physical Exam





- Physical Exam


Vital Signs: 


 Vital Signs











  18





  16:54


 


Temperature 98 F


 


Pulse Rate 70


 


Respiratory 18





Rate 


 


Blood Pressure 116/59


 


O2 Sat by Pulse 98





Oximetry 











Physical Exam: 





GENERAL: The patient is well-developed well-nourished male lying on stretcher 

not appearing to be in acute distress. []


HEENT: Normocephalic.  Atraumatic.  Extraocular motions are intact.  Patient 

has moist mucous membranes.


NECK: Supple.  Trachea midline


CHEST/LUNGS: Clear to auscultation.  There is no respiratory distress noted.


HEART/CARDIOVASCULAR: Regular.  There is no tachycardia.  There is no gallop 

rub or murmur.


ABDOMEN: Abdomen is soft, nontender.  Patient has normal bowel sounds.  There 

is no abdominal distention.


SKIN: There is no rash.  There is no edema.  There is no diaphoresis.


NEURO: The patient is awake, alert, and oriented.  The patient is cooperative.  

The patient has normal speech


MUSCULOSKELETAL:  There is no evidence of acute injury.





ED Course


 Vital Signs











  18





  16:54


 


Temperature 98 F


 


Pulse Rate 70


 


Respiratory 18





Rate 


 


Blood Pressure 116/59


 


O2 Sat by Pulse 98





Oximetry 














ED Medical Decision Making





- Lab Data


Result diagrams: 


 18 17:35





 18 17:35





 Laboratory Tests











  18





  17:35 17:35 17:35


 


WBC  4.5  


 


RBC  4.80  


 


Hgb  13.8  


 


Hct  41.4  


 


MCV  86  


 


MCH  29  


 


MCHC  33  


 


RDW  14.4  


 


Plt Count  210  


 


Lymph % (Auto)  47.4 H  


 


Mono % (Auto)  7.7 H  


 


Eos % (Auto)  3.6  


 


Baso % (Auto)  0.4  


 


Lymph #  2.1  


 


Mono #  0.3  


 


Eos #  0.2  


 


Baso #  0.0  


 


Seg Neutrophils %  40.9  


 


Seg Neutrophils #  1.8  


 


D-Dimer    < 135.00


 


Sodium   142 


 


Potassium   4.0 


 


Chloride   102.4 


 


Carbon Dioxide   28 


 


Anion Gap   16 


 


BUN   11 


 


Creatinine   0.7 L 


 


Estimated GFR   > 60 


 


BUN/Creatinine Ratio   16 


 


Glucose   100 


 


Calcium   9.0 


 


Total Creatine Kinase   


 


CK-MB (CK-2)   


 


CK-MB (CK-2) Rel Index   


 


Troponin T   < 0.010 














  18





  17:35 19:45


 


WBC  


 


RBC  


 


Hgb  


 


Hct  


 


MCV  


 


MCH  


 


MCHC  


 


RDW  


 


Plt Count  


 


Lymph % (Auto)  


 


Mono % (Auto)  


 


Eos % (Auto)  


 


Baso % (Auto)  


 


Lymph #  


 


Mono #  


 


Eos #  


 


Baso #  


 


Seg Neutrophils %  


 


Seg Neutrophils #  


 


D-Dimer  


 


Sodium  


 


Potassium  


 


Chloride  


 


Carbon Dioxide  


 


Anion Gap  


 


BUN  


 


Creatinine  


 


Estimated GFR  


 


BUN/Creatinine Ratio  


 


Glucose  


 


Calcium  


 


Total Creatine Kinase  433 H 


 


CK-MB (CK-2)  4.2 H 


 


CK-MB (CK-2) Rel Index  0.9 


 


Troponin T   < 0.010














- EKG Data


-: EKG Interpreted by Me


EKG shows normal: sinus rhythm


Rate: normal





- EKG Data


When compared to previous EKG there are: no significant change


Interpretation: unchanged when compared t (2018), nonspecific ST-T wave 

suzi (T-wave inversion in lead 3)





- Radiology Data


Radiology results: report reviewed (chest x-ray), image reviewed (chest x-ray)


interpreted by me: 





Chest x-ray-no focal infiltrates, no pneumothorax





[facility] 


[address] [place] [address] 





XRay Report 


Signed 





Patient: DANELLE RICHARDSON MR#: [id number] 


: 1980 Acct:[id number] 


Age/Sex: 37 / M ADM Date: 18 


Loc: ED 


Attending Dr: 








Ordering Physician: ALEJANDRA GRUBER MD 


Date of Service: 18 


Procedure(s): XR chest routine 2V 


Accession Number(s): [id number] 





cc: ALEJANDRA GRUBER MD 





Fluoro Time In Minutes: 





FINAL REPORT 





EXAM: XR CHEST ROUTINE 2V 





HISTORY: chest pain 





TECHNIQUE: Two view chest PA and lateral 





PRIORS: None. 





FINDINGS: 


Cardiac and mediastinal contours are unremarkable. No focal 


pulmonary infiltrate is identified. No pleural fluid collection 


seen. Pulmonary vasculature is unremarkable. 





IMPRESSION: 


Negative two-view chest 











Transcribed By: YARED 


Dictated By: GITA JAIN MD 


Electronically Authenticated By: GITA JAIN MD 


Signed Date/Time: 18 











DD/DT: 18 


TD/TT: 18





- Differential Diagnosis


GERD, PE, ACS, costochondritis


Critical care attestation.: 


If time is entered above; I have spent that time in minutes in the direct care 

of this critically ill patient, excluding procedure time.








ED Disposition


Clinical Impression: 


 Chest pain





Disposition: DC-01 TO HOME OR SELFCARE


Is pt being admited?: No


Does the pt Need Aspirin: No


Condition: Stable


Instructions:  Chest Pain (ED)


Additional Instructions: 


Return to the emergency department immediately should you develop worsening 

symptoms, fever, inability to tolerate food or liquid or any other concerns.


Prescriptions: 


Famotidine [Pepcid] 20 mg PO BID #20 tablet


traMADol [Ultram] 50 mg PO Q6HR PRN #14 tablet


 PRN Reason: Pain


Referrals: 


ERINNE,SAMUEL C, MD [Staff Physician] - 3-5 Days


Time of Disposition: 20:19

## 2018-06-08 NOTE — EMERGENCY DEPARTMENT REPORT
HPI





- General


Chief Complaint: Chest Pain


Time Seen by Provider: 10/02/17 11:03





- HPI


HPI: 





Room 19





The patient is a 36-year-old male presented with a chief complaint of chest 

pain.  The patient states last night at approximately 20:00 while at rest on 

his bed using his telephone he developed left-sided chest pain described as 

sharp in nature.  Patient states his chest pain has been constant and 

associated with shortness of breath and diaphoresis.  Patient denies nausea/

vomiting or cough.  Patient denies previous episodes of same pain.  The patient 

currently gives his chest pain score of 9/10.  The patient states he's never 

had a stress test or cardiac catheterization





Location: Left chest


Duration: Constant since 20:00


Quality: Sharp


Severity: 9/10


Modifying factors: [see above]


Context: [see above]


Mode of transportation: Unknown





ED Past Medical Hx





- Past Medical History


Hx Psychiatric Treatment:  (bipolar/schizo not taking meds)





- Surgical History


Past Surgical History?: No





- Family History


Family history: no significant





- Social History


Smoking Status: Never Smoker


Substance Use Type: None





ED Review of Systems


ROS: 


Stated complaint: CHEST PAIN


Other details as noted in HPI





Comment: All other systems reviewed and negative


Constitutional: diaphoresis


Eyes: denies: eye pain, eye discharge, vision change


ENT: denies: ear pain, throat pain


Respiratory: shortness of breath


Cardiovascular: chest pain


Endocrine: no symptoms reported


Gastrointestinal: denies: nausea, vomiting


Genitourinary: denies: urgency, dysuria


Musculoskeletal: denies: back pain, joint swelling, arthralgia


Skin: denies: rash, lesions


Neurological: denies: headache, weakness, paresthesias


Psychiatric: denies: anxiety, depression


Hematological/Lymphatic: denies: easy bleeding, easy bruising





Physical Exam





- Physical Exam


Vital Signs: 


 Vital Signs











  10/01/17 10/01/17 10/02/17





  21:49 21:52 10:33


 


Temperature 98.6 F 98.6 F 


 


Pulse Rate 56 L 59 L 64


 


Respiratory 18 18 





Rate   


 


Blood Pressure 129/90 129/90 


 


O2 Sat by Pulse 98 99 





Oximetry   











Physical Exam: 





GENERAL: The patient is well-developed well-nourished male lying on stretcher 

not appearing to be in acute distress. []


HEENT: Normocephalic.  Atraumatic.  Extraocular motions are intact.  Patient 

has moist mucous membranes.


NECK: Supple.  Trachea midline


CHEST/LUNGS: Clear to auscultation.  There is no respiratory distress noted.


HEART/CARDIOVASCULAR: Regular.  There is no tachycardia.  There is no gallop 

rub or murmur.


ABDOMEN: Abdomen is soft, nontender.  Patient has normal bowel sounds.  There 

is no abdominal distention.


SKIN: There is no rash.  There is no diaphoresis.


NEURO: The patient is awake, alert, and oriented.  The patient is cooperative. 

  The patient has normal speech 


MUSCULOSKELETAL:  There is no evidence of acute injury.





ED Course


 Vital Signs











  10/01/17 10/01/17 10/02/17





  21:49 21:52 10:33


 


Temperature 98.6 F 98.6 F 


 


Pulse Rate 56 L 59 L 64


 


Respiratory 18 18 





Rate   


 


Blood Pressure 129/90 129/90 


 


O2 Sat by Pulse 98 99 





Oximetry   














ED Medical Decision Making





- Lab Data


Result diagrams: 


 10/01/17 22:09





 10/01/17 22:09





 Laboratory Tests











  10/01/17 10/01/17 10/02/17





  22:09 22:09 00:45


 


WBC  5.2  


 


RBC  4.93  


 


Hgb  14.1  


 


Hct  42.0  


 


MCV  85  


 


MCH  29  


 


MCHC  34  


 


RDW  14.1  


 


Plt Count  190  


 


Lymph % (Auto)  54.7 H  


 


Mono % (Auto)  4.9  


 


Eos % (Auto)  1.4  


 


Baso % (Auto)  0.5  


 


Lymph #  2.8  


 


Mono #  0.3  


 


Eos #  0.1  


 


Baso #  0.0  


 


Seg Neutrophils %  38.5 L  


 


Seg Neutrophils #  2.0  


 


Sodium   144 


 


Potassium   3.9 


 


Chloride   106.3 


 


Carbon Dioxide   24 


 


Anion Gap   18 


 


BUN   13 


 


Creatinine   0.7 L 


 


Estimated GFR   > 60 


 


BUN/Creatinine Ratio   18.57 


 


Glucose   95 


 


Calcium   8.7 


 


Troponin T   < 0.010  < 0.010














  10/02/17





  03:32


 


WBC 


 


RBC 


 


Hgb 


 


Hct 


 


MCV 


 


MCH 


 


MCHC 


 


RDW 


 


Plt Count 


 


Lymph % (Auto) 


 


Mono % (Auto) 


 


Eos % (Auto) 


 


Baso % (Auto) 


 


Lymph # 


 


Mono # 


 


Eos # 


 


Baso # 


 


Seg Neutrophils % 


 


Seg Neutrophils # 


 


Sodium 


 


Potassium 


 


Chloride 


 


Carbon Dioxide 


 


Anion Gap 


 


BUN 


 


Creatinine 


 


Estimated GFR 


 


BUN/Creatinine Ratio 


 


Glucose 


 


Calcium 


 


Troponin T  < 0.010














- EKG Data


-: EKG Interpreted by Me


EKG shows normal: sinus rhythm


Rate: bradycardia (54 bpm)





- EKG Data


When compared to previous EKG there are: previous EKG unavailable


Interpretation: other (no ischemic changes seen)





- Radiology Data


Radiology results: image reviewed (chest x-ray)


interpreted by me: 





Chest x-ray-no focal infiltrates, no pneumothorax





- Differential Diagnosis


ACS, GERD, pericarditis, anxiety, pneumonia, pneumothorax


Critical care attestation.: 


If time is entered above; I have spent that time in minutes in the direct care 

of this critically ill patient, excluding procedure time.








ED Disposition


Clinical Impression: 


 Chest pain





Disposition: DC-09 OP ADMIT IP TO THIS HOSP


Is pt being admited?: Yes


Does the pt Need Aspirin: Yes


Condition: Fair


Instructions:  Chest Pain (ED)


Referrals: 


PRIMARY CARE,MD [Primary Care Provider] - 3-5 Days


Time of Disposition: 11:31 (hospitalist paged) WDL

## 2021-12-28 ENCOUNTER — HOSPITAL ENCOUNTER (EMERGENCY)
Dept: HOSPITAL 5 - ED | Age: 41
LOS: 1 days | Discharge: HOME | End: 2021-12-29
Payer: MEDICAID

## 2021-12-28 DIAGNOSIS — U07.1: Primary | ICD-10-CM

## 2021-12-28 DIAGNOSIS — F31.9: ICD-10-CM

## 2021-12-28 DIAGNOSIS — F20.9: ICD-10-CM

## 2021-12-28 DIAGNOSIS — R45.851: ICD-10-CM

## 2021-12-28 DIAGNOSIS — Z88.8: ICD-10-CM

## 2021-12-28 LAB
ALBUMIN SERPL-MCNC: 4.3 G/DL (ref 3.9–5)
ALT SERPL-CCNC: 63 UNITS/L (ref 7–56)
BASOPHILS # (AUTO): 0 K/MM3 (ref 0–0.1)
BASOPHILS NFR BLD AUTO: 0.3 % (ref 0–1.8)
BILIRUB UR QL STRIP: (no result)
BLOOD UR QL VISUAL: (no result)
BUN SERPL-MCNC: 6 MG/DL (ref 9–20)
BUN/CREAT SERPL: 10 %
CALCIUM SERPL-MCNC: 9.4 MG/DL (ref 8.4–10.2)
EOSINOPHIL # BLD AUTO: 0.1 K/MM3 (ref 0–0.4)
EOSINOPHIL NFR BLD AUTO: 2.1 % (ref 0–4.3)
HCT VFR BLD CALC: 43.8 % (ref 35.5–45.6)
HEMOLYSIS INDEX: 38
HGB BLD-MCNC: 14.2 GM/DL (ref 11.8–15.2)
LYMPHOCYTES # BLD AUTO: 2 K/MM3 (ref 1.2–5.4)
LYMPHOCYTES NFR BLD AUTO: 53.6 % (ref 13.4–35)
MCHC RBC AUTO-ENTMCNC: 32 % (ref 32–34)
MCV RBC AUTO: 87 FL (ref 84–94)
MONOCYTES # (AUTO): 0.2 K/MM3 (ref 0–0.8)
MONOCYTES % (AUTO): 5.9 % (ref 0–7.3)
MUCOUS THREADS #/AREA URNS HPF: (no result) /HPF
PH UR STRIP: 5 [PH] (ref 5–7)
PLATELET # BLD: 234 K/MM3 (ref 140–440)
PROT UR STRIP-MCNC: (no result) MG/DL
RBC # BLD AUTO: 5.01 M/MM3 (ref 3.65–5.03)
RBC #/AREA URNS HPF: 2 /HPF (ref 0–6)
UROBILINOGEN UR-MCNC: < 2 MG/DL (ref ?–2)
WBC #/AREA URNS HPF: 10 /HPF (ref 0–6)

## 2021-12-28 PROCEDURE — 87076 CULTURE ANAEROBE IDENT EACH: CPT

## 2021-12-28 PROCEDURE — U0003 INFECTIOUS AGENT DETECTION BY NUCLEIC ACID (DNA OR RNA); SEVERE ACUTE RESPIRATORY SYNDROME CORONAVIRUS 2 (SARS-COV-2) (CORONAVIRUS DISEASE [COVID-19]), AMPLIFIED PROBE TECHNIQUE, MAKING USE OF HIGH THROUGHPUT TECHNOLOGIES AS DESCRIBED BY CMS-2020-01-R: HCPCS

## 2021-12-28 PROCEDURE — 80053 COMPREHEN METABOLIC PANEL: CPT

## 2021-12-28 PROCEDURE — 80307 DRUG TEST PRSMV CHEM ANLYZR: CPT

## 2021-12-28 PROCEDURE — 87086 URINE CULTURE/COLONY COUNT: CPT

## 2021-12-28 PROCEDURE — 81001 URINALYSIS AUTO W/SCOPE: CPT

## 2021-12-28 PROCEDURE — 99284 EMERGENCY DEPT VISIT MOD MDM: CPT

## 2021-12-28 PROCEDURE — 36415 COLL VENOUS BLD VENIPUNCTURE: CPT

## 2021-12-28 PROCEDURE — 80320 DRUG SCREEN QUANTALCOHOLS: CPT

## 2021-12-28 PROCEDURE — 85025 COMPLETE CBC W/AUTO DIFF WBC: CPT

## 2021-12-28 PROCEDURE — G0480 DRUG TEST DEF 1-7 CLASSES: HCPCS

## 2021-12-28 NOTE — EMERGENCY DEPARTMENT REPORT
ED General Adult HPI





- General


Stated complaint: SUICIDAL THOUGHTS


PUI?: No


Time Seen by Provider: 12/28/21 13:02


Source: patient


Mode of arrival: Ambulatory


Limitations: No Limitations





- History of Present Illness


Initial comments: 





41-year-old -American male with past medical history of bipolar and 

schizophrenia, brought to the emergency room by EMS for evaluation of suicidal 

ideation.  Patient reported his symptoms started about 2 hours ago prior to come

to the emergency room.  Patient stated he wants to live by himself he is tired 

of living with people, as a result he wanted to harm himself.  He has plans to 

cut himself with a knife.  Patient reported he is currently taking respite all 

to treat his bipolar and schizophrenia.  He has a follow-up appointment with his

psychiatrist on 12/30/2021.  However patient reported he is actively suicidal.


MD Complaint: Suicidal ideation


-: hour(s) (2)





- Related Data


                                Home Medications











 Medication  Instructions  Recorded  Confirmed  Last Taken


 


risperiDONE [RisperDAL] 1 mg PO QHS 03/29/18 12/28/21 Unknown











                                    Allergies











Allergy/AdvReac Type Severity Reaction Status Date / Time


 


No Known Allergies Allergy   Verified 10/01/17 22:30














ED Review of Systems


ROS: 


Stated complaint: SUICIDAL THOUGHTS


Other details as noted in HPI





Comment: All other systems reviewed and negative


Constitutional: no symptoms reported.  denies: chills, fever


Eyes: as per HPI.  denies: eye pain, vision change


ENT: as per HPI.  denies: ear pain, throat pain


Respiratory: no symptoms reported


Cardiovascular: as per HPI.  denies: chest pain, palpitations


Endocrine: no symptoms reported.  denies: flushing


Gastrointestinal: as per HPI.  denies: abdominal pain, nausea, vomiting


Genitourinary: as per HPI.  denies: urgency, dysuria


Musculoskeletal: as per HPI.  denies: back pain


Skin: as per HPI.  denies: lesions


Neurological: as per HPI.  denies: headache, weakness


Psychiatric: as per HPI, suicidal thoughts


Hematological/Lymphatic: as per HPI





ED Past Medical Hx





- Past Medical History


Hx Congestive Heart Failure: No


Hx Diabetes: No


Hx Arthritis: No


Hx Psychiatric Treatment: Yes (bipolar/schizo)


Hx Asthma: No


Hx COPD: No





- Social History


Smoking Status: Never Smoker


Substance Use Type: None (denies illicit drug use)





- Medications


Home Medications: 


                                Home Medications











 Medication  Instructions  Recorded  Confirmed  Last Taken  Type


 


risperiDONE [RisperDAL] 1 mg PO QHS 03/29/18 12/28/21 Unknown History














ED Physical Exam





- General


Limitations: No Limitations


General appearance: alert, in no apparent distress, anxious, obese





- Head


Head exam: Absent: atraumatic, normocephalic, normal inspection





- Eye


Eye exam: Present: normal appearance.  Absent: PERRL, EOMI


Pupils: Present: normal accommodation





- ENT


ENT exam: Present: normal exam





- Neck


Neck exam: Present: normal inspection





- Respiratory


Respiratory exam: Present: normal lung sounds bilaterally.  Absent: respiratory 

distress, wheezes, rales, rhonchi





- Cardiovascular


Cardiovascular Exam: Present: regular rate, normal rhythm, normal heart sounds





- GI/Abdominal


GI/Abdominal exam: Present: soft.  Absent: distended, tenderness, guarding, 

rebound





- Extremities Exam


Extremities exam: Present: normal inspection, full ROM.  Absent: tenderness





- Back Exam


Back exam: Present: normal inspection, full ROM.  Absent: tenderness, CVA 

tenderness (R)





- Neurological Exam


Neurological exam: Present: alert, oriented X3





- Psychiatric


Psychiatric exam: Present: suicidal ideation.  Absent: normal affect, normal 

mood, depressed, homicidal ideation





- Skin


Skin exam: Present: warm





ED Course


                                   Vital Signs











  12/28/21 12/28/21





  13:56 14:24


 


Temperature 98.0 F 


 


Pulse Rate 89 


 


Respiratory 16 





Rate  


 


Blood Pressure 144/87 





[Right]  


 


O2 Sat by Pulse 98 98





Oximetry  














- Reevaluation(s)


Reevaluation #1: 





12/28/21 13:06


Patient brought to the emergency room for evaluation of suicidal ideation.  

Patient reports symptoms started 2 hours prior to come to the ED.  Labs has been

 ordered for evaluation.  1013 has been ordered.  I have also placed a consult 

to discuss the case with mental health.  We will continue to monitor and 

reevaluate


Reevaluation #2: 





12/28/21 16:51


Lab results reviewed:


CBC with WBC of 3.8


Chemistry notable for normal electrolytes, normal kidney function, elevated 

blood glucose of 207, LFT within normal limits


Toxicology ASA level and APAP level nonactionable


PCR Covid test, drug screen and urinalysis, sample has not been collected for 

testing


Reevaluation #3: 





12/28/21 19:03


I have reviewed the note provided by the mental health , who recommend 

continuation of the 1013 as patient has command hallucination and suicide 

attempt.  Patient is now currently medically clear for psych transfer to a psych

 facility.





ED Medical Decision Making





- Lab Data


Result diagrams: 


                                 12/28/21 13:40





                                 12/28/21 13:40


Critical care attestation.: 


If time is entered above; I have spent that time in minutes in the direct care 

of this critically ill patient, excluding procedure time.








ED Disposition


Clinical Impression: 


 Suicidal ideation, Hallucination





Condition: Stable


Referrals: 


[facility],MD [Primary Care Provider] - 3-5 Days

## 2021-12-29 VITALS — DIASTOLIC BLOOD PRESSURE: 87 MMHG | SYSTOLIC BLOOD PRESSURE: 133 MMHG

## 2021-12-29 NOTE — EMERGENCY DEPARTMENT REPORT
Blank Doc





- Documentation


Documentation: 





Chart was presented for discharge.  Mental health note noted and although esteban

ent does have hallucinations telling him to kill himself he states that he is 

not suicidal or homicidal.  Patient has been deemed safe for discharge by mental

health providers.  Urine shows 10 WBCs without reports of bacteria.  Culture 

pending.  He will be empirically treated with Keflex.  Covid test positive 

without reported signs of hypoxia.

## 2021-12-29 NOTE — EVENT NOTE
Date: 12/29/21





S: No events reported overnight





O:


                               Vital Signs - 8 hr











  12/29/21





  12:09


 


Temperature 98.5 F


 


Pulse Rate 71


 


Respiratory 16





Rate 


 


Blood Pressure 133/87





[Right] 


 


O2 Sat by Pulse 97





Oximetry 














A: Suicidal ideation, schizophrenia





P: Awaiting psych placement

## 2022-02-11 ENCOUNTER — HOSPITAL ENCOUNTER (EMERGENCY)
Dept: HOSPITAL 5 - ED | Age: 42
LOS: 1 days | Discharge: HOME | End: 2022-02-12
Payer: MEDICAID

## 2022-02-11 VITALS — SYSTOLIC BLOOD PRESSURE: 126 MMHG | DIASTOLIC BLOOD PRESSURE: 80 MMHG

## 2022-02-11 DIAGNOSIS — E11.9: ICD-10-CM

## 2022-02-11 DIAGNOSIS — F20.9: ICD-10-CM

## 2022-02-11 DIAGNOSIS — Z79.899: ICD-10-CM

## 2022-02-11 DIAGNOSIS — R45.851: Primary | ICD-10-CM

## 2022-02-11 DIAGNOSIS — F31.9: ICD-10-CM

## 2022-02-11 DIAGNOSIS — Z20.822: ICD-10-CM

## 2022-02-11 DIAGNOSIS — Z13.30: ICD-10-CM

## 2022-02-11 LAB
BUN SERPL-MCNC: 15 MG/DL (ref 9–20)
BUN/CREAT SERPL: 19 %
CALCIUM SERPL-MCNC: 9 MG/DL (ref 8.4–10.2)
HCT VFR BLD CALC: 44.4 % (ref 35.5–45.6)
HEMOLYSIS INDEX: 15
HGB BLD-MCNC: 14.3 GM/DL (ref 11.8–15.2)
MCHC RBC AUTO-ENTMCNC: 32 % (ref 32–34)
MCV RBC AUTO: 85 FL (ref 84–94)
PLATELET # BLD: 213 K/MM3 (ref 140–440)
RBC # BLD AUTO: 5.2 M/MM3 (ref 3.65–5.03)

## 2022-02-11 PROCEDURE — 85027 COMPLETE CBC AUTOMATED: CPT

## 2022-02-11 PROCEDURE — U0003 INFECTIOUS AGENT DETECTION BY NUCLEIC ACID (DNA OR RNA); SEVERE ACUTE RESPIRATORY SYNDROME CORONAVIRUS 2 (SARS-COV-2) (CORONAVIRUS DISEASE [COVID-19]), AMPLIFIED PROBE TECHNIQUE, MAKING USE OF HIGH THROUGHPUT TECHNOLOGIES AS DESCRIBED BY CMS-2020-01-R: HCPCS

## 2022-02-11 PROCEDURE — 80320 DRUG SCREEN QUANTALCOHOLS: CPT

## 2022-02-11 PROCEDURE — 99284 EMERGENCY DEPT VISIT MOD MDM: CPT

## 2022-02-11 PROCEDURE — 80048 BASIC METABOLIC PNL TOTAL CA: CPT

## 2022-02-11 PROCEDURE — G0480 DRUG TEST DEF 1-7 CLASSES: HCPCS

## 2022-02-11 PROCEDURE — 36415 COLL VENOUS BLD VENIPUNCTURE: CPT

## 2022-02-11 NOTE — EMERGENCY DEPARTMENT REPORT
ED General Adult HPI





- General


Chief complaint: Psych


Stated complaint: I am suicidal


PUI?: No


Time Seen by Provider: 02/11/22 20:12


Source: patient, EMS ( EMS documentation not available at time of chart 

dictation ), RN notes reviewed, old records reviewed


Mode of arrival: Ambulatory


Limitations: No Limitations





- History of Present Illness


Initial comments: 





The patient was evaluated in the emergency department for symptoms described in 

the history of present illness.  He/she was evaluated in the context of the 

global COVID-19 pandemic, which necessitated consideration that the patient 

might be at risk for infection with the virus that causes COVID-19.  

Institutional protocols and algorithms that pertain to the evaluation of 

patients at risk for COVID-19 are in a state of rapid change based on 

information released by regulatory bodies including the CDC and federal and 

state organizations.  These policies and algorithms were followed during the 

patient's care in the emergency department.  Please note that these policies, 

procedures and recommendations changed on a rapid basis.








This patient is a 41-year-old gentleman, who presents to the ER today with a 

complaint of painless suicidality.  He denies additional medical complaints.  He

endorses nonspecific hallucinations.  He indicates his symptoms are constant, 

painless, do not radiate anywhere, he does not describe exacerbating or 

relieving factors.


Severity scale (0 -10): 0


Consistency: constant


Improves with: none


Worsens with: none


Associated Symptoms: denies other symptoms





- Related Data


                                Home Medications











 Medication  Instructions  Recorded  Confirmed  Last Taken


 


risperiDONE [RisperDAL] 1 mg PO QHS 03/29/18 12/28/21 Unknown








                                  Previous Rx's











 Medication  Instructions  Recorded  Last Taken  Type


 


cephALEXin [Keflex] 500 mg PO Q12HR #14 cap 12/29/21 Unknown Rx











                                    Allergies











Allergy/AdvReac Type Severity Reaction Status Date / Time


 


No Known Allergies Allergy   Verified 10/01/17 22:30














ED Review of Systems


ROS: 


Stated complaint: MH


Other details as noted in HPI





Comment: All other systems reviewed and negative


Psychiatric: auditory hallucinations, visual hallucinations, suicidal thoughts





ED Past Medical Hx





- Past Medical History


Previous Medical History?: Yes


Hx Congestive Heart Failure: No


Hx Diabetes: Yes


Hx Arthritis: No


Hx Seizures: Yes


Hx Psychiatric Treatment: Yes (bipolar/schizo)


Hx Asthma: No


Hx COPD: No





- Surgical History


Past Surgical History?: No





- Social History


Smoking Status: Never Smoker


Substance Use Type: None (denies illicit drug use)





- Medications


Home Medications: 


                                Home Medications











 Medication  Instructions  Recorded  Confirmed  Last Taken  Type


 


risperiDONE [RisperDAL] 1 mg PO QHS 03/29/18 12/28/21 Unknown History


 


cephALEXin [Keflex] 500 mg PO Q12HR #14 cap 12/29/21  Unknown Rx














ED Physical Exam





- General


Limitations: No Limitations


General appearance: alert, in no apparent distress, obese





- Head


Head exam: Present: atraumatic, normocephalic





- Eye


Eye exam: Present: normal appearance, EOMI.  Absent: nystagmus





- ENT


ENT exam: Present: normal exam, normal orophraynx, mucous membranes moist, 

normal external ear exam





- Neck


Neck exam: Present: normal inspection, full ROM.  Absent: tenderness





- Respiratory


Respiratory exam: Present: normal lung sounds bilaterally.  Absent: respiratory 

distress, wheezes, rales, rhonchi, stridor, decreased breath sounds





- Cardiovascular


Cardiovascular Exam: Present: regular rate, normal rhythm, normal heart sounds. 

 Absent: bradycardia, tachycardia, irregular rhythm, systolic murmur, diastolic 

murmur, rubs, gallop





- GI/Abdominal


GI/Abdominal exam: Present: soft.  Absent: distended, tenderness, guarding, re

bound, rigid, pulsatile mass





- Rectal


Rectal exam: Present: deferred





- Extremities Exam


Extremities exam: Present: normal inspection, full ROM, other (2+ pulses noted 

in the bilateral upper and lower extremities.  There is no palpable cord.   

negative Homans sign.  Muscular compartments are soft.  The pelvis is stable.). 

 Absent: pedal edema, calf tenderness





- Back Exam


Back exam: Present: normal inspection, full ROM.  Absent: tenderness, CVA 

tenderness (R), CVA tenderness (L), paraspinal tenderness, vertebral tenderness





- Neurological Exam


Neurological exam: Present: alert, oriented X3, normal gait, other (No facial 

droop.  Tongue midline.  Extraocular movements intact bilaterally.  Facial 

sensation intact to light touch in V1, V2, V3 distribution bilaterally.  5 and a

 5 strength in 4 extremities.  Sensation intact to light touch in 4 

extremities.).  Absent: motor sensory deficit





- Psychiatric


Psychiatric exam: Present: flat affect, suicidal ideation





- Skin


Skin exam: Present: warm, dry, intact, normal color.  Absent: rash





ED Course


                                   Vital Signs











  02/11/22 02/11/22





  19:35 20:24


 


Temperature 98.4 F 98.4 F


 


Pulse Rate 80 80


 


Respiratory 18 18





Rate  


 


Blood Pressure 126/84 126/80





[Right]  


 


O2 Sat by Pulse 98 98





Oximetry  














- Reevaluation(s)


Reevaluation #1: 





02/11/22 21:50


Differential diagnosis, including but not limited to: Suicidality, hallucinati

ons, medical clearance for psychiatric placement





Assessment and plan: 41-year-old gentleman, who was afebrile, with reassuring 

vital signs, with a benign and unremarkable physical examination, who ambulates 

with a steady gait, who presents to the ER today with a complaint of painless 

suicidality.  He also endorses hallucinations.  1013 form is ordered, and filled

 out by myself.  Appropriate screening laboratory studies are unremarkable.  

Urinalysis, drug screen ordered in anticipation of psychiatric request.  Urine 

drug screen/urinalysis not required to exclude emergent medical condition at t

his time.  Covid swab also ordered to facilitate placement.  The emergency 

department will follow along as the patient provides these tests.  At this point

 in time, the patient does not appear to have an emergent medical condition at 

this time which would preclude psychiatric admission, evaluation, consultation 

and placement.





ED Medical Decision Making





- Lab Data


Result diagrams: 


                                 02/11/22 20:26





                                 02/11/22 20:26








                                   Vital Signs











  02/11/22 02/11/22





  19:35 20:24


 


Temperature 98.4 F 98.4 F


 


Pulse Rate 80 80


 


Respiratory 18 18





Rate  


 


Blood Pressure 126/84 126/80





[Right]  


 


O2 Sat by Pulse 98 98





Oximetry  











                                   Lab Results











  02/11/22 02/11/22 02/11/22 Range/Units





  20:26 20:26 20:26 


 


WBC     (4.5-11.0)  K/mm3


 


RBC     (3.65-5.03)  M/mm3


 


Hgb     (11.8-15.2)  gm/dl


 


Hct     (35.5-45.6)  %


 


MCV     (84-94)  fl


 


MCH     (28-32)  pg


 


MCHC     (32-34)  %


 


RDW     (13.2-15.2)  %


 


Plt Count     (140-440)  K/mm3


 


Sodium  138    (137-145)  mmol/L


 


Potassium  3.7    (3.6-5.0)  mmol/L


 


Chloride  102.1    ()  mmol/L


 


Carbon Dioxide  22    (22-30)  mmol/L


 


Anion Gap  18    mmol/L


 


BUN  15    (9-20)  mg/dL


 


Creatinine  0.8    (0.8-1.3)  mg/dL


 


Estimated GFR  > 60    ml/min


 


BUN/Creatinine Ratio  19    %


 


Glucose  129 H    ()  mg/dL


 


Calcium  9.0    (8.4-10.2)  mg/dL


 


Salicylates   < 0.3 L   (2.8-20.0)  mg/dL


 


Acetaminophen    5.0 L  (10.0-30.0)  ug/mL


 


Plasma/Serum Alcohol     (0-0.07)  %














  02/11/22 02/11/22 Range/Units





  20:26 20:26 


 


WBC   4.7  (4.5-11.0)  K/mm3


 


RBC   5.20 H  (3.65-5.03)  M/mm3


 


Hgb   14.3  (11.8-15.2)  gm/dl


 


Hct   44.4  (35.5-45.6)  %


 


MCV   85  (84-94)  fl


 


MCH   28  (28-32)  pg


 


MCHC   32  (32-34)  %


 


RDW   14.0  (13.2-15.2)  %


 


Plt Count   213  (140-440)  K/mm3


 


Sodium    (137-145)  mmol/L


 


Potassium    (3.6-5.0)  mmol/L


 


Chloride    ()  mmol/L


 


Carbon Dioxide    (22-30)  mmol/L


 


Anion Gap    mmol/L


 


BUN    (9-20)  mg/dL


 


Creatinine    (0.8-1.3)  mg/dL


 


Estimated GFR    ml/min


 


BUN/Creatinine Ratio    %


 


Glucose    ()  mg/dL


 


Calcium    (8.4-10.2)  mg/dL


 


Salicylates    (2.8-20.0)  mg/dL


 


Acetaminophen    (10.0-30.0)  ug/mL


 


Plasma/Serum Alcohol  < 0.01   (0-0.07)  %











Critical care attestation.: 


If time is entered above; I have spent that time in minutes in the direct care 

of this critically ill patient, excluding procedure time.








ED Disposition


Clinical Impression: 


 Medical clearance for psychiatric admission, Suicidal ideation





Disposition: [address] [place] [address]


Is pt being admited?: No


Does the pt Need Aspirin: No


Condition: Good


Referrals: 


[facility], MD [Primary Care Provider] - 3-5 Days

## 2022-02-12 NOTE — EVENT NOTE
Date: 02/12/22





labs are ok , vss , no distress , assessed by psych , will continue hold for 

depression and SI

## 2022-02-12 NOTE — CONSULTATION
History of Present Illness





- Reason for Consult


Consult date: 02/12/22


Reason for consult: SI





- History of Present Psychiatric Illness


The patient was seen today. He is calm, and cooperative. He says he presented to

the ER for suicidal attempt by cutting himself with a fork. He says "well really

I wasn't trying to kill myself." The patient says "I'm on risperidone, I need 

you to up it." He says he takes it every day, but at times doesn't feel like 

it's working as well. The patient says he lives with his cousin. He says he 

called the ambulance because his meds weren't working. The patient denies 

suicidal at present, he says "naw, I feel great now." I asked him what changed 

things, he says "like I said, I really wasn't trying to kill myself." The 

patient says he has a history of schizophrenia and bipolar. He denies 

hallucinations of any kind. He says "up my risperidone, I'll be good. I'm ready 

to go home now." The patient says he takes risperidone 1mg at night. 





 PSYCHIATRIC HISTORY:


Diagnoses: Bipolar, Schizophrenia


Suicide attempts or Self-harm behavior: Yes


Prior psychiatric hospitalizations: Yes


Substance Abuse history: Denies


Previous psychiatric medications tried: Risperidone


Outpatient treatment: [facility]





PAST MEDICAL HISTORY: None reported or document





Family Psychiatric History: None reported or documented





SOCIAL HISTORY


Marital Status: Single 


Living Arrangements: with cousin


Employment Status: Disabled


Access to guns/weapons: Denies


Education: 9th


History of Abuse: Denies


Legal History: unknown





REVIEW OF SYSTEMS  


Constitutional: Negative for weight loss


ENT: Negative for stridor


Respiratory: Negative for cough or hemoptysis


All other systems reviewed and are negative





MENTAL STATUS EXAMINATION


General Appearance and Behavior: Age appropriate, good hygiene, wearing 

appropriate clothes. calm, cooperative


Cooperation: Cooperative


Psychomotor Behavior: Psychomotor normal


Mood: "great right now"


Affect and affective range: Congruent with stated mood


Thought Process: goal directed


Thought Content: reality oriented


Speech: normal tone and pace


Suicidal Ideation: Denies


Homicidal Ideation:Denies


Hallucinations: Denies


Delusions: None elicited


Impulse Control: limited


Insight and Judgment: Limited


Memory: Limited


Attention: attentive


Orientation: a/o x 3





 Assessment 


(1) Schizophrenia


Current Visit: Yes   Status: Acute





Treatment Plan


d/c 1013


Risperidone 2mg po qhs


Risks, benefits and alternatives of medications discussed with the patient, 

questions answered and consent obtained from patient.


PSYCHOTHERAPY: Supportive psychotherapy provided


MEDICAL: Per primary team


DELIRIUM PRECAUTIONS: Please re-orient patient frequently, keep lights on during

the day, and minimize benzodiazepines and opiates as these medications could 

worsen patient's confusion.


SAFETY SITTER: Defer to primary


DISPOSITION: Do not recommend acute inpatient psychiatric hospitalization at 

this time. The patient understands that if SI/HI or any fear of endangerment for

herself or others she is to seek immediate assistance


The patient to follow up with outpatient psych in 7 to 14 days upon safety plan


The  to give the patient resources for group homes. 


FOLLOW-UP: Will sign off. Thanks


Case staffed with Dr. Boudreaux








Medications and Allergies


                                    Allergies











Allergy/AdvReac Type Severity Reaction Status Date / Time


 


No Known Allergies Allergy   Verified 10/01/17 22:30











                                Home Medications











 Medication  Instructions  Recorded  Confirmed  Last Taken  Type


 


risperiDONE [RisperDAL] 1 mg PO QHS 03/29/18 12/28/21 Unknown History


 


cephALEXin [Keflex] 500 mg PO Q12HR #14 cap 12/29/21  Unknown Rx


 


risperiDONE [RisperDAL] 2 mg PO QHS #30 02/12/22  Unknown Rx











Active Meds: 


Active Medications





Diphenhydramine HCl (Diphenhydramine 25 Mg Cap)  50 mg PO QHS PRN


   PRN Reason: Insomnia


Haloperidol Lactate (Haloperidol Lactate 5 Mg/1 Ml Inj)  5 mg IM Q6HR PRN


   PRN Reason: Agitation


Lorazepam (Lorazepam 2 Mg/Ml Vial)  2 mg IM Q4HR PRN


   PRN Reason: Agitation











Mental Status Exam





- Vital signs


                                Last Vital Signs











Temp  98.4 F   02/11/22 20:24


 


Pulse  80   02/11/22 20:24


 


Resp  18   02/11/22 20:24


 


BP  126/80   02/11/22 20:24


 


Pulse Ox  98   02/11/22 20:24














Results


Result Diagrams: 


                                 02/11/22 20:26





                                 02/11/22 20:26


                              Abnormal lab results











  02/11/22 02/11/22 02/11/22 Range/Units





  20:26 20:26 20:26 


 


RBC     (3.65-5.03)  M/mm3


 


Glucose  129 H    ()  mg/dL


 


Salicylates   < 0.3 L   (2.8-20.0)  mg/dL


 


Acetaminophen    5.0 L  (10.0-30.0)  ug/mL














  02/11/22 Range/Units





  20:26 


 


RBC  5.20 H  (3.65-5.03)  M/mm3


 


Glucose   ()  mg/dL


 


Salicylates   (2.8-20.0)  mg/dL


 


Acetaminophen   (10.0-30.0)  ug/mL








All other labs normal.

## 2022-03-12 ENCOUNTER — HOSPITAL ENCOUNTER (EMERGENCY)
Dept: HOSPITAL 5 - ED | Age: 42
LOS: 1 days | Discharge: HOME | End: 2022-03-13
Payer: MEDICAID

## 2022-03-12 VITALS — DIASTOLIC BLOOD PRESSURE: 86 MMHG | SYSTOLIC BLOOD PRESSURE: 153 MMHG

## 2022-03-12 DIAGNOSIS — Z79.899: ICD-10-CM

## 2022-03-12 DIAGNOSIS — R55: Primary | ICD-10-CM

## 2022-03-12 LAB
BASOPHILS # (AUTO): 0 K/MM3 (ref 0–0.1)
BASOPHILS NFR BLD AUTO: 0.3 % (ref 0–1.8)
BILIRUB UR QL STRIP: (no result)
BLOOD UR QL VISUAL: (no result)
BUN SERPL-MCNC: 9 MG/DL (ref 9–20)
BUN/CREAT SERPL: 13 %
CALCIUM SERPL-MCNC: 8.8 MG/DL (ref 8.4–10.2)
EOSINOPHIL # BLD AUTO: 0 K/MM3 (ref 0–0.4)
EOSINOPHIL NFR BLD AUTO: 0.7 % (ref 0–4.3)
HCT VFR BLD CALC: 43.1 % (ref 35.5–45.6)
HEMOLYSIS INDEX: 60
HGB BLD-MCNC: 14.3 GM/DL (ref 11.8–15.2)
LYMPHOCYTES # BLD AUTO: 2.2 K/MM3 (ref 1.2–5.4)
LYMPHOCYTES NFR BLD AUTO: 43.8 % (ref 13.4–35)
MCHC RBC AUTO-ENTMCNC: 33 % (ref 32–34)
MCV RBC AUTO: 87 FL (ref 84–94)
MONOCYTES # (AUTO): 0.4 K/MM3 (ref 0–0.8)
MONOCYTES % (AUTO): 7.1 % (ref 0–7.3)
PH UR STRIP: 7 [PH] (ref 5–7)
PLATELET # BLD: 188 K/MM3 (ref 140–440)
PROT UR STRIP-MCNC: (no result) MG/DL
RBC # BLD AUTO: 4.95 M/MM3 (ref 3.65–5.03)
RBC #/AREA URNS HPF: 0 /HPF (ref 0–6)
UROBILINOGEN UR-MCNC: < 2 MG/DL (ref ?–2)
WBC #/AREA URNS HPF: < 1 /HPF (ref 0–6)

## 2022-03-12 PROCEDURE — 85025 COMPLETE CBC W/AUTO DIFF WBC: CPT

## 2022-03-12 PROCEDURE — 80307 DRUG TEST PRSMV CHEM ANLYZR: CPT

## 2022-03-12 PROCEDURE — 71045 X-RAY EXAM CHEST 1 VIEW: CPT

## 2022-03-12 PROCEDURE — 80048 BASIC METABOLIC PNL TOTAL CA: CPT

## 2022-03-12 PROCEDURE — 70450 CT HEAD/BRAIN W/O DYE: CPT

## 2022-03-12 PROCEDURE — 81001 URINALYSIS AUTO W/SCOPE: CPT

## 2022-03-12 PROCEDURE — 82550 ASSAY OF CK (CPK): CPT

## 2022-03-12 PROCEDURE — 83735 ASSAY OF MAGNESIUM: CPT

## 2022-03-12 PROCEDURE — 84484 ASSAY OF TROPONIN QUANT: CPT

## 2022-03-12 PROCEDURE — 82553 CREATINE MB FRACTION: CPT

## 2022-03-12 PROCEDURE — 93005 ELECTROCARDIOGRAM TRACING: CPT

## 2022-03-12 PROCEDURE — 99285 EMERGENCY DEPT VISIT HI MDM: CPT

## 2022-03-12 PROCEDURE — 85379 FIBRIN DEGRADATION QUANT: CPT

## 2022-03-12 PROCEDURE — 36415 COLL VENOUS BLD VENIPUNCTURE: CPT

## 2022-03-12 NOTE — XRAY REPORT
CHEST 1 VIEW 3/12/2022 10:14 PM



INDICATION / CLINICAL INFORMATION: Shortness of breath, syncope.



COMPARISON: 4/8/2018



FINDINGS:



SUPPORT DEVICES: None.

HEART / MEDIASTINUM: Mild cardiomegaly. 

LUNGS / PLEURA: No significant pulmonary or pleural abnormality. Prior granulomatous exposure. No pne
umothorax. 



ADDITIONAL FINDINGS: No significant additional findings.



IMPRESSION: No acute abnormality.



Signer Name: Celso Babin MD 

Signed: 3/12/2022 10:35 PM

Workstation Name: [id number]

## 2022-03-12 NOTE — CAT SCAN REPORT
CT HEAD WITHOUT CONTRAST



INDICATION / CLINICAL INFORMATION: Syncope.



TECHNIQUE: All CT scans at this location are performed using CT dose reduction for ALARA by means of 
automated exposure control. 



COMPARISON: 3/29/2018



FINDINGS:

HEMORRHAGE: None.

ACUTE INFARCTION: No Significant Abnormality

MASS/MASS EFFECT: No Significant Abnormality



CEREBRAL PARENCHYMA: No acute focal attenuation abnormality.

VENTRICULAR SYSTEM: Normal in size and morphology for the patient's age.



ORBITS: Normal as visualized.

SKULL: No significant abnormality.

PARANASAL SINUSES / MASTOID AIR CELLS: Normal as visualized.



ADDITIONAL FINDINGS: None.



IMPRESSION:

1.  No acute intracranial abnormality.



Signer Name: Alex Pringle MD 

Signed: 3/12/2022 9:26 PM

Workstation Name: [id number]

## 2022-03-12 NOTE — EMERGENCY DEPARTMENT REPORT
HPI





- General


Chief Complaint: Syncope


Time Seen by Provider: 22 20:33





- HPI


HPI: 





Room 19








The patient is a 41-year-old male presenting with a chief complaint of syncope. 

The patient states [place] while seated he said he began to 

have a headache and dizziness.  The patient states he developed some shortness 

of breath after standing up he had a syncopal episode.  Patient has a history of

seizures but there was no reported seizure activity by EMS.  Patient denied 

having palpitations, chest pain or nausea/vomiting.  Currently the patient only 

complains of a headache.  Patient states he has been compliant with his [facility]





ED Past Medical Hx





- Past Medical History


Previous Medical History?: Yes


Hx Diabetes: Yes


Hx Seizures: Yes


Hx Psychiatric Treatment: Yes (bipolar/schizo)





- Surgical History


Past Surgical History?: No





- Social History


Smoking Status: Never Smoker


Substance Use Type: None (Denies illicit drug use)





- Medications


Home Medications: 


                                Home Medications











 Medication  Instructions  Recorded  Confirmed  Last Taken  Type


 


risperiDONE [RisperDAL] 1 mg PO QHS 18 Unknown History


 


cephALEXin [Keflex] 500 mg PO Q12HR #14 cap 21  Unknown Rx


 


risperiDONE [RisperDAL] 2 mg PO QHS #30 22  Unknown Rx


 


Butalb/Acetamin/Caff -40 2 tab PO Q8HR PRN #10 tablet 22  Unknown Rx





[Fioricet -40]     














ED Review of Systems


ROS: 


Stated complaint: SYNCOPE/FAL


Other details as noted in HPI





Constitutional: no symptoms reported


Eyes: denies: eye pain


ENT: denies: throat pain


Respiratory: shortness of breath.  denies: cough


Cardiovascular: denies: chest pain, palpitations


Endocrine: no symptoms reported


Gastrointestinal: denies: abdominal pain, nausea, vomiting


Genitourinary: denies: dysuria


Musculoskeletal: denies: back pain


Neurological: headache





Physical Exam





- Physical Exam


Vital Signs: 


                                   Vital Signs











  22





  20:01


 


Temperature 98.5 F


 


Pulse Rate 77


 


Respiratory 20





Rate 


 


Blood Pressure 153/86


 


O2 Sat by Pulse 98





Oximetry 











Physical Exam: 





GENERAL: The patient is well-developed well-nourished male lying on stretcher 

not appearing to be in acute distress. []


HEENT: Normocephalic.  Atraumatic.  Extraocular motions are intact.  Patient has

 moist mucous membranes.


NECK: Supple.  Trachea midline


CHEST/LUNGS: Clear to auscultation.  There is no respiratory distress noted.


HEART/CARDIOVASCULAR: Regular.  There is no tachycardia.  There is no gallop rub

 or murmur.


ABDOMEN: Abdomen is soft, nontender.  Patient has normal bowel sounds.  There is

 no abdominal distention.


SKIN: There is no rash.  There is no edema.  There is no diaphoresis.


NEURO: The patient is awake, alert, and oriented.  The patient is cooperative.  

The patient has no focal neurologic deficits.  The patient has normal speech.  

Cranial nerves II through XII grossly intact


MUSCULOSKELETAL: There is no evidence of acute injury.





ED Course


                                   Vital Signs











  22





  20:01


 


Temperature 98.5 F


 


Pulse Rate 77


 


Respiratory 20





Rate 


 


Blood Pressure 153/86


 


O2 Sat by Pulse 98





Oximetry 














- Reevaluation(s)


Reevaluation #1: 





22 23:46


Patient states he feels improved after medication.  Patient updated on work-up. 

 Will order second troponin if negative patient will be discharged home





ED Medical Decision Making





- Lab Data


Result diagrams: 


                                 22 20:50





                                 22 20:50





                                Laboratory Tests











  22





  20:50 20:50 20:50


 


WBC  5.0  


 


RBC  4.95  


 


Hgb  14.3  


 


Hct  43.1  


 


MCV  87  


 


MCH  29  


 


MCHC  33  


 


RDW  14.3  


 


Plt Count  188  


 


Lymph % (Auto)  43.8 H  


 


Mono % (Auto)  7.1  


 


Eos % (Auto)  0.7  


 


Baso % (Auto)  0.3  


 


Lymph # (Auto)  2.2  


 


Mono # (Auto)  0.4  


 


Eos # (Auto)  0.0  


 


Baso # (Auto)  0.0  


 


Seg Neutrophils %  48.1  


 


Seg Neutrophils #  2.4  


 


D-Dimer    211.70


 


Sodium   


 


Potassium   


 


Chloride   


 


Carbon Dioxide   


 


Anion Gap   


 


BUN   


 


Creatinine   


 


Estimated GFR   


 


BUN/Creatinine Ratio   


 


Glucose   


 


Calcium   


 


Magnesium   


 


Total Creatine Kinase   274 H 


 


CK-MB (CK-2)   3.6 


 


CK-MB (CK-2) Rel Index   1.3 


 


Troponin T   


 


Urine Color   


 


Urine Turbidity   


 


Urine pH   


 


Ur Specific Gravity   


 


Urine Protein   


 


Urine Glucose (UA)   


 


Urine Ketones   


 


Urine Blood   


 


Urine Nitrite   


 


Urine Bilirubin   


 


Urine Urobilinogen   


 


Ur Leukocyte Esterase   


 


Urine WBC (Auto)   


 


Urine RBC (Auto)   


 


U Epithel Cells (Auto)   


 


Urine Opiates Screen   


 


Urine Methadone Screen   


 


Ur Barbiturates Screen   


 


Ur Phencyclidine Scrn   


 


Ur Amphetamines Screen   


 


U Benzodiazepines Scrn   


 


Urine Cocaine Screen   


 


U Marijuana (THC) Screen   


 


Drugs of Abuse Note   














  22





  20:50 20:50 20:52


 


WBC   


 


RBC   


 


Hgb   


 


Hct   


 


MCV   


 


MCH   


 


MCHC   


 


RDW   


 


Plt Count   


 


Lymph % (Auto)   


 


Mono % (Auto)   


 


Eos % (Auto)   


 


Baso % (Auto)   


 


Lymph # (Auto)   


 


Mono # (Auto)   


 


Eos # (Auto)   


 


Baso # (Auto)   


 


Seg Neutrophils %   


 


Seg Neutrophils #   


 


D-Dimer   


 


Sodium  142  


 


Potassium  3.5 L  


 


Chloride  102.3  


 


Carbon Dioxide  29  


 


Anion Gap  14  


 


BUN  9  


 


Creatinine  0.7 L  


 


Estimated GFR  > 60  


 


BUN/Creatinine Ratio  13  


 


Glucose  115 H  


 


Calcium  8.8  


 


Magnesium   2.10 


 


Total Creatine Kinase   


 


CK-MB (CK-2)   


 


CK-MB (CK-2) Rel Index   


 


Troponin T  < 0.010  


 


Urine Color    Straw


 


Urine Turbidity    Clear


 


Urine pH    7.0


 


Ur Specific Gravity    1.025


 


Urine Protein    <15 mg/dl


 


Urine Glucose (UA)    >=500


 


Urine Ketones    20


 


Urine Blood    Neg


 


Urine Nitrite    Neg


 


Urine Bilirubin    Neg


 


Urine Urobilinogen    < 2.0


 


Ur Leukocyte Esterase    Neg


 


Urine WBC (Auto)    < 1.0


 


Urine RBC (Auto)    0.0


 


U Epithel Cells (Auto)    < 1.0


 


Urine Opiates Screen   


 


Urine Methadone Screen   


 


Ur Barbiturates Screen   


 


Ur Phencyclidine Scrn   


 


Ur Amphetamines Screen   


 


U Benzodiazepines Scrn   


 


Urine Cocaine Screen   


 


U Marijuana (THC) Screen   


 


Drugs of Abuse Note   














  22





  20:52


 


WBC 


 


RBC 


 


Hgb 


 


Hct 


 


MCV 


 


MCH 


 


MCHC 


 


RDW 


 


Plt Count 


 


Lymph % (Auto) 


 


Mono % (Auto) 


 


Eos % (Auto) 


 


Baso % (Auto) 


 


Lymph # (Auto) 


 


Mono # (Auto) 


 


Eos # (Auto) 


 


Baso # (Auto) 


 


Seg Neutrophils % 


 


Seg Neutrophils # 


 


D-Dimer 


 


Sodium 


 


Potassium 


 


Chloride 


 


Carbon Dioxide 


 


Anion Gap 


 


BUN 


 


Creatinine 


 


Estimated GFR 


 


BUN/Creatinine Ratio 


 


Glucose 


 


Calcium 


 


Magnesium 


 


Total Creatine Kinase 


 


CK-MB (CK-2) 


 


CK-MB (CK-2) Rel Index 


 


Troponin T 


 


Urine Color 


 


Urine Turbidity 


 


Urine pH 


 


Ur Specific Gravity 


 


Urine Protein 


 


Urine Glucose (UA) 


 


Urine Ketones 


 


Urine Blood 


 


Urine Nitrite 


 


Urine Bilirubin 


 


Urine Urobilinogen 


 


Ur Leukocyte Esterase 


 


Urine WBC (Auto) 


 


Urine RBC (Auto) 


 


U Epithel Cells (Auto) 


 


Urine Opiates Screen  Negative


 


Urine Methadone Screen  Negative


 


Ur Barbiturates Screen  Negative


 


Ur Phencyclidine Scrn  Negative


 


Ur Amphetamines Screen  Negative


 


U Benzodiazepines Scrn  Negative


 


Urine Cocaine Screen  Negative


 


U Marijuana (THC) Screen  Negative


 


Drugs of Abuse Note  Disclamer








                                Laboratory Tests











  22





  20:50 20:50 20:50


 


WBC  5.0  


 


RBC  4.95  


 


Hgb  14.3  


 


Hct  43.1  


 


MCV  87  


 


MCH  29  


 


MCHC  33  


 


RDW  14.3  


 


Plt Count  188  


 


Lymph % (Auto)  43.8 H  


 


Mono % (Auto)  7.1  


 


Eos % (Auto)  0.7  


 


Baso % (Auto)  0.3  


 


Lymph # (Auto)  2.2  


 


Mono # (Auto)  0.4  


 


Eos # (Auto)  0.0  


 


Baso # (Auto)  0.0  


 


Seg Neutrophils %  48.1  


 


Seg Neutrophils #  2.4  


 


D-Dimer    211.70


 


Sodium   


 


Potassium   


 


Chloride   


 


Carbon Dioxide   


 


Anion Gap   


 


BUN   


 


Creatinine   


 


Estimated GFR   


 


BUN/Creatinine Ratio   


 


Glucose   


 


Calcium   


 


Magnesium   


 


Total Creatine Kinase   274 H 


 


CK-MB (CK-2)   3.6 


 


CK-MB (CK-2) Rel Index   1.3 


 


Troponin T   


 


Urine Color   


 


Urine Turbidity   


 


Urine pH   


 


Ur Specific Gravity   


 


Urine Protein   


 


Urine Glucose (UA)   


 


Urine Ketones   


 


Urine Blood   


 


Urine Nitrite   


 


Urine Bilirubin   


 


Urine Urobilinogen   


 


Ur Leukocyte Esterase   


 


Urine WBC (Auto)   


 


Urine RBC (Auto)   


 


U Epithel Cells (Auto)   


 


Urine Opiates Screen   


 


Urine Methadone Screen   


 


Ur Barbiturates Screen   


 


Ur Phencyclidine Scrn   


 


Ur Amphetamines Screen   


 


U Benzodiazepines Scrn   


 


Urine Cocaine Screen   


 


U Marijuana (THC) Screen   


 


Drugs of Abuse Note   














  22





  20:50 20:50 20:52


 


WBC   


 


RBC   


 


Hgb   


 


Hct   


 


MCV   


 


MCH   


 


MCHC   


 


RDW   


 


Plt Count   


 


Lymph % (Auto)   


 


Mono % (Auto)   


 


Eos % (Auto)   


 


Baso % (Auto)   


 


Lymph # (Auto)   


 


Mono # (Auto)   


 


Eos # (Auto)   


 


Baso # (Auto)   


 


Seg Neutrophils %   


 


Seg Neutrophils #   


 


D-Dimer   


 


Sodium  142  


 


Potassium  3.5 L  


 


Chloride  102.3  


 


Carbon Dioxide  29  


 


Anion Gap  14  


 


BUN  9  


 


Creatinine  0.7 L  


 


Estimated GFR  > 60  


 


BUN/Creatinine Ratio  13  


 


Glucose  115 H  


 


Calcium  8.8  


 


Magnesium   2.10 


 


Total Creatine Kinase   


 


CK-MB (CK-2)   


 


CK-MB (CK-2) Rel Index   


 


Troponin T  < 0.010  


 


Urine Color    Straw


 


Urine Turbidity    Clear


 


Urine pH    7.0


 


Ur Specific Gravity    1.025


 


Urine Protein    <15 mg/dl


 


Urine Glucose (UA)    >=500


 


Urine Ketones    20


 


Urine Blood    Neg


 


Urine Nitrite    Neg


 


Urine Bilirubin    Neg


 


Urine Urobilinogen    < 2.0


 


Ur Leukocyte Esterase    Neg


 


Urine WBC (Auto)    < 1.0


 


Urine RBC (Auto)    0.0


 


U Epithel Cells (Auto)    < 1.0


 


Urine Opiates Screen   


 


Urine Methadone Screen   


 


Ur Barbiturates Screen   


 


Ur Phencyclidine Scrn   


 


Ur Amphetamines Screen   


 


U Benzodiazepines Scrn   


 


Urine Cocaine Screen   


 


U Marijuana (THC) Screen   


 


Drugs of Abuse Note   














  22





  20:52 23:48


 


WBC  


 


RBC  


 


Hgb  


 


Hct  


 


MCV  


 


MCH  


 


MCHC  


 


RDW  


 


Plt Count  


 


Lymph % (Auto)  


 


Mono % (Auto)  


 


Eos % (Auto)  


 


Baso % (Auto)  


 


Lymph # (Auto)  


 


Mono # (Auto)  


 


Eos # (Auto)  


 


Baso # (Auto)  


 


Seg Neutrophils %  


 


Seg Neutrophils #  


 


D-Dimer  


 


Sodium  


 


Potassium  


 


Chloride  


 


Carbon Dioxide  


 


Anion Gap  


 


BUN  


 


Creatinine  


 


Estimated GFR  


 


BUN/Creatinine Ratio  


 


Glucose  


 


Calcium  


 


Magnesium  


 


Total Creatine Kinase  


 


CK-MB (CK-2)  


 


CK-MB (CK-2) Rel Index  


 


Troponin T   < 0.010


 


Urine Color  


 


Urine Turbidity  


 


Urine pH  


 


Ur Specific Gravity  


 


Urine Protein  


 


Urine Glucose (UA)  


 


Urine Ketones  


 


Urine Blood  


 


Urine Nitrite  


 


Urine Bilirubin  


 


Urine Urobilinogen  


 


Ur Leukocyte Esterase  


 


Urine WBC (Auto)  


 


Urine RBC (Auto)  


 


U Epithel Cells (Auto)  


 


Urine Opiates Screen  Negative 


 


Urine Methadone Screen  Negative 


 


Ur Barbiturates Screen  Negative 


 


Ur Phencyclidine Scrn  Negative 


 


Ur Amphetamines Screen  Negative 


 


U Benzodiazepines Scrn  Negative 


 


Urine Cocaine Screen  Negative 


 


U Marijuana (THC) Screen  Negative 


 


Drugs of Abuse Note  Disclamer 














- EKG Data


-: EKG Interpreted by Me


EKG shows normal: sinus rhythm


Rate: normal





- EKG Data


When compared to previous EKG there are: no significant change


Interpretation: unchanged when compared t (2018)





- Radiology Data


Radiology results: report reviewed (CT head, chest x-ray), image reviewed (CT 

head, chest x-ray)


interpreted by me: 





Chest x-ray-no definite focal infiltrates, no pneumothorax





[facility] [address] [place] [address] Cat

 Scan Report Signed Patient: DANELLE RICHARDSON MR#: [id number] :  Acct:[id number] Age/Sex: 41 / M ADM Date: 22 Loc: ED Attending 

Dr: Ordering Physician: ALEJANDRA GRUBER MD Date of Service: 22 Procedure(s): 

CT head/brain wo con Accession Number(s): [id number] cc: ALEJANDRA GRUBER MD CT HEAD 

WITHOUT CONTRAST INDICATION / CLINICAL INFORMATION: Syncope. TECHNIQUE: All CT 

scans at this location are performed using CT dose reduction for ALARA by means 

of automated exposure control. COMPARISON: 3/29/2018 FINDINGS: HEMORRHAGE: None.

 ACUTE INFARCTION: No Significant Abnormality MASS/MASS EFFECT: No Significant 

Abnormality CEREBRAL PARENCHYMA: No acute focal attenuation abnormality. 

VENTRICULAR SYSTEM: Normal in size and morphology for the patient's age. ORBITS:

 Normal as visualized. SKULL: No significant abnormality. PARANASAL SINUSES / 

MASTOID AIR CELLS: Normal as visualized. ADDITIONAL FINDINGS: None. IMPRESSION: 

1. No acute intracranial abnormality. Signer Name: Alex Pringle MD Signed: 

3/12/2022 9:26 PM Workstation Name: [id number] Transcribed By: SB Dictated By:

 ALEX PRINGLE MD Electronically Authenticated By: ALEX PRINGLE MD 

Signed Date/Time: 22 DD/DT: 22 TD/TT:








[facility] [address] [place] [address] 

XRay Report Signed Patient: DANELLE RICHARDSON MR#: [id number] : 1980

 Acct:[id number] Age/Sex: 41 / M ADM Date: 22 Loc: ED Attending Dr: 

Ordering Physician: ALEJANDRA GRUBER MD Date of Service: 22 Procedure(s): XR 

chest 1V ap Accession Number(s): [id number] cc: ALEJANDRA GRUBER MD Fluoro Time In 

Minutes: CHEST 1 VIEW 3/12/2022 10:14 PM INDICATION / CLINICAL INFORMATION: 

Shortness of breath, syncope. COMPARISON: 2018 FINDINGS: SUPPORT DEVICES: 

None. HEART / MEDIASTINUM: Mild cardiomegaly. LUNGS / PLEURA: No significant 

pulmonary or pleural abnormality. Prior granulomatous exposure. No pneumothorax.

 ADDITIONAL FINDINGS: No significant additional findings. IMPRESSION: No acute 

abnormality. Signer Name: Celso Babin MD Signed: 3/12/2022 10:35 PM 

Workstation Name: [id number] Transcribed By: ES Dictated By: Celso Babin MD Electronically Authenticated By: Celso Babin MD Signed 

Date/Time: 22 DD/DT: 22 TD/TT:





- Differential Diagnosis


Orthostasis, vasovagal syncope, PE, dysrhythmia, seizure


Critical care attestation.: 


If time is entered above; I have spent that time in minutes in the direct care 

of this critically ill patient, excluding procedure time.








ED Disposition


Clinical Impression: 


 Vasovagal syncope





Disposition:  HOME / SELF CARE / HOMELESS


Is pt being admited?: No


Does the pt Need Aspirin: No


Condition: Stable


Instructions:  Syncope, Easy-to-Read, Syncope (ED)


Additional Instructions: 


Return to the emergency department should you develop worsening symptoms, 

inability to tolerate food or liquids, high fever or any other concerns


Prescriptions: 


Butalb/Acetamin/Caff -40 [Fioricet -40] 2 tab PO Q8HR PRN #10 tablet


 PRN Reason: Headache


Referrals: 


JOEL CORDOVA MD [Staff Physician] - 3-5 Days

## 2022-03-14 NOTE — ELECTROCARDIOGRAPH REPORT
[facility]

                                       

Test Date:    2022               Test Time:    20:33:36

Pat Name:     DANELLE RICHARDSON           Department:   

Patient ID:   [id number]          Room:          

Gender:       M                        Technician:   ER

:          1980               Requested By: ALEJANDRA GRUBER

Order Number: [id number]              Reading MD:   Naif Freeman

                                 Measurements

Intervals                              Axis          

Rate:         70                       P:            45

CO:           150                      QRS:          17

QRSD:         81                       T:            14

QT:           380                                    

QTc:          411                                    

                           Interpretive Statements

Sinus rhythm

No previous ECG available for comparison

Electronically Signed On 3- 10:53:35 EDT by Naif Freeman

## 2022-04-18 ENCOUNTER — HOSPITAL ENCOUNTER (EMERGENCY)
Dept: HOSPITAL 5 - ED | Age: 42
LOS: 2 days | Discharge: HOME | End: 2022-04-20
Payer: MEDICAID

## 2022-04-18 DIAGNOSIS — R45.851: Primary | ICD-10-CM

## 2022-04-18 DIAGNOSIS — F20.9: ICD-10-CM

## 2022-04-18 DIAGNOSIS — E11.8: ICD-10-CM

## 2022-04-18 LAB
BENZODIAZEPINES SCREEN,URINE: (no result)
BILIRUB UR QL STRIP: (no result)
BLOOD UR QL VISUAL: (no result)
BUN SERPL-MCNC: 8 MG/DL (ref 9–20)
BUN/CREAT SERPL: 11 %
CALCIUM SERPL-MCNC: 9.4 MG/DL (ref 8.4–10.2)
HCT VFR BLD CALC: 45.4 % (ref 35.5–45.6)
HEMOLYSIS INDEX: 26
HGB BLD-MCNC: 14.8 GM/DL (ref 11.8–15.2)
MCHC RBC AUTO-ENTMCNC: 33 % (ref 32–34)
MCV RBC AUTO: 87 FL (ref 84–94)
METHADONE SCREEN,URINE: (no result)
OPIATE SCREEN,URINE: (no result)
PH UR STRIP: 6 [PH] (ref 5–7)
PLATELET # BLD: 168 K/MM3 (ref 140–440)
PROT UR STRIP-MCNC: (no result) MG/DL
RBC # BLD AUTO: 5.2 M/MM3 (ref 3.65–5.03)
RBC #/AREA URNS HPF: < 1 /HPF (ref 0–6)
UROBILINOGEN UR-MCNC: < 2 MG/DL (ref ?–2)
WBC #/AREA URNS HPF: 1 /HPF (ref 0–6)

## 2022-04-18 PROCEDURE — 85007 BL SMEAR W/DIFF WBC COUNT: CPT

## 2022-04-18 PROCEDURE — 80048 BASIC METABOLIC PNL TOTAL CA: CPT

## 2022-04-18 PROCEDURE — 36415 COLL VENOUS BLD VENIPUNCTURE: CPT

## 2022-04-18 PROCEDURE — 80307 DRUG TEST PRSMV CHEM ANLYZR: CPT

## 2022-04-18 PROCEDURE — 99284 EMERGENCY DEPT VISIT MOD MDM: CPT

## 2022-04-18 PROCEDURE — G0480 DRUG TEST DEF 1-7 CLASSES: HCPCS

## 2022-04-18 PROCEDURE — 81001 URINALYSIS AUTO W/SCOPE: CPT

## 2022-04-18 PROCEDURE — 80320 DRUG SCREEN QUANTALCOHOLS: CPT

## 2022-04-18 PROCEDURE — 85025 COMPLETE CBC W/AUTO DIFF WBC: CPT

## 2022-04-18 NOTE — EMERGENCY DEPARTMENT REPORT
ED Psych HPI





- General


Chief Complaint: Psych


Stated Complaint: SUICIDAL THOUGHTS


Time Seen by Provider: 04/18/22 20:29


Source: patient, EMS


Mode of arrival: Ambulatory





- History of Present Illness


Initial Comments: 





Patient is 42 years old male with history of schizophrenia and seizure.  Patient

presented to the ER for mental health evaluation.  Patient stated that he is 

hearing voices asking him that he have AIDS and he need to be checked.  He also 

reported suicidal ideation.  He stated that he tried to drink bleach and cut 

himself but his cousin prevent him from doing that.  He denied visual 

hallucination.  No homicidal ideation.


MD Complaint: suicidal ideation


-: days(s)


Associated Psychiatric Symptoms: suicidal ideation, racing thoughts, delusions


Quality: constant


Associated Symptoms: denies other symptoms


Treatments Prior to Arrival: none


If Self Harm: admits thoughts of, has plan, has acted on plan





- Related Data


                                Home Medications











 Medication  Instructions  Recorded  Confirmed  Last Taken


 


risperiDONE [RisperDAL] 1 mg PO QHS 03/29/18 12/28/21 Unknown








                                  Previous Rx's











 Medication  Instructions  Recorded  Last Taken  Type


 


cephALEXin [Keflex] 500 mg PO Q12HR #14 cap 12/29/21 Unknown Rx


 


risperiDONE [RisperDAL] 2 mg PO QHS #30 02/12/22 Unknown Rx


 


Butalb/Acetamin/Caff -40 2 tab PO Q8HR PRN #10 tablet 03/12/22 Unknown Rx





[Fioricet -40]    


 


risperiDONE [RisperDAL] 2 mg PO QHS 30 Days #30 04/20/22 Unknown Rx











                                    Allergies











Allergy/AdvReac Type Severity Reaction Status Date / Time


 


No Known Allergies Allergy   Verified 04/19/22 12:18














ED Review of Systems


ROS: 


Stated complaint: SUICIDAL THOUGHTS


Other details as noted in HPI





Comment: All other systems reviewed and negative


Constitutional: denies: chills, fever


Respiratory: denies: cough, shortness of breath, SOB with exertion


Cardiovascular: denies: chest pain, palpitations


Gastrointestinal: denies: abdominal pain, nausea, vomiting, diarrhea, constipa

tion, hematemesis, melena, hematochezia


Musculoskeletal: denies: back pain


Neurological: denies: headache, weakness, numbness, paresthesias, confusion


Psychiatric: auditory hallucinations, suicidal thoughts.  denies: visual h

allucinations, homicidal thoughts





ED Past Medical Hx





- Past Medical History


Previous Medical History?: Yes


Hx Congestive Heart Failure: No


Hx Diabetes: Yes


Hx Arthritis: No


Hx Seizures: Yes


Hx Psychiatric Treatment: Yes (bipolar/schizo)


Hx Asthma: No


Hx COPD: No





- Surgical History


Past Surgical History?: No





- Social History


Smoking Status: Never Smoker


Substance Use Type: None (Denies illicit drug use)





- Medications


Home Medications: 


                                Home Medications











 Medication  Instructions  Recorded  Confirmed  Last Taken  Type


 


risperiDONE [RisperDAL] 1 mg PO QHS 03/29/18 12/28/21 Unknown History


 


cephALEXin [Keflex] 500 mg PO Q12HR #14 cap 12/29/21  Unknown Rx


 


risperiDONE [RisperDAL] 2 mg PO QHS #30 02/12/22  Unknown Rx


 


Butalb/Acetamin/Caff -40 2 tab PO Q8HR PRN #10 tablet 03/12/22  Unknown Rx





[Fioricet -40]     


 


risperiDONE [RisperDAL] 2 mg PO QHS 30 Days #30 04/20/22  Unknown Rx














ED Physical Exam





- General


Limitations: No Limitations


General appearance: alert, in no apparent distress





- Head


Head exam: Present: atraumatic, normocephalic, normal inspection





- Eye


Eye exam: Present: normal appearance





- ENT


ENT exam: Present: normal exam, normal orophraynx, mucous membranes moist





- Neck


Neck exam: Present: normal inspection, full ROM.  Absent: tenderness, 

meningismus





- Respiratory


Respiratory exam: Present: normal lung sounds bilaterally





- Cardiovascular


Cardiovascular Exam: Present: regular rate, normal rhythm, normal heart sounds





- GI/Abdominal


GI/Abdominal exam: Present: soft, normal bowel sounds.  Absent: distended, 

tenderness, guarding, rebound, rigid, organomegaly, mass, bruit, pulsatile mass,

 hernia





- Extremities Exam


Extremities exam: Present: normal inspection, full ROM, normal capillary refill.

  Absent: tenderness, pedal edema, joint swelling, calf tenderness





- Back Exam


Back exam: Present: normal inspection, full ROM.  Absent: CVA tenderness (R), 

CVA tenderness (L)





- Neurological Exam


Neurological exam: Present: alert, oriented X3, CN II-XII intact, normal gait, 

reflexes normal.  Absent: motor sensory deficit





- Psychiatric


Psychiatric exam: Present: anxious, suicidal ideation.  Absent: homicidal 

ideation





- Skin


Skin exam: Present: warm, intact, normal color





ED Course


                                   Vital Signs











  04/18/22 04/19/22 04/19/22





  20:10 00:10 06:09


 


Temperature 97.2 F L 98.5 F 


 


Pulse Rate 66 85 


 


Respiratory 18 18 





Rate   


 


Blood Pressure 134/94 119/86 





[Right]   


 


O2 Sat by Pulse 100 98 98





Oximetry   














  04/19/22 04/19/22 04/20/22





  09:05 19:57 08:29


 


Temperature 98.6 F 99.0 F 98.3 F


 


Pulse Rate 68 72 75


 


Respiratory 18 18 18





Rate   


 


Blood Pressure 118/76 141/90 132/81





[Right]   


 


O2 Sat by Pulse 98 98 100





Oximetry   














ED Medical Decision Making





- Lab Data


Result diagrams: 


                                 04/18/22 21:04





                                 04/18/22 21:04





- Medical Decision Making





Patient is 42 years old male with history of schizophrenia and seizure.  Patient

 presented to the ER for mental health evaluation.  Patient stated that he is 

hearing voices asking him that he have AIDS and he need to be checked.  He also 

reported suicidal ideation.  He stated that he tried to drink bleach and cut 

himself but his cousin prevent him from doing that.  He denied visual 

hallucination.  No homicidal ideation.





Labs reviewed and is unremarkable however urinalysis and UDS is still pending.  

Patient is medically cleared to be evaluated by psychiatric.





Critical care attestation.: 


If time is entered above; I have spent that time in minutes in the direct care 

of this critically ill patient, excluding procedure time.








ED Disposition


Clinical Impression: 


 Schizophrenia, Suicidal ideation





Disposition: 01 HOME / SELF CARE / HOMELESS


Is pt being admited?: No


Condition: Stable


Additional Instructions: 


Professional and Agency Contacts To help Resolve Crises(24/7)


[place] Crisis Line: 1-123.799.7570


Suicide Prevention Line: 1-294.243.3946


Crisis Text Line: Text START to 436587


Emergency: 911





Outpatient [facility] Behavioral Health Resources:





[place]: [facility]


[address], [place] [address] Phone: [phone]





[place]:


[facility]


[address] [place] [address]


Phone: [phone]





[place]:


[facility] -


[address] [place] [address]


Phone: [phone]


Monday thru Friday - 8am - 5pm





[place]:


[facility]


Address: [address], [place] [address]


Phone: [phone]





[place]:


[facility]


Address: [address] [place] [address]


Monday thru Friday- 7am-2pm


Phone: [phone]





[facility]


Address: [address], [place] [address]


Monday thru Friday: 8:30AM-5PM


Phone: [phone]


Prescriptions: 


risperiDONE [RisperDAL] 2 mg PO QHS 30 Days #30


Referrals: 


PRIMARY CARE,MD [Primary Care Provider] - 3-5 Days

## 2022-04-19 LAB
BAND NEUTROPHILS # (MANUAL): 0 K/MM3
MYELOCYTES # (MANUAL): 0 K/MM3
PROMYELOCYTES # (MANUAL): 0 K/MM3
TOTAL CELLS COUNTED BLD: 100

## 2022-04-19 NOTE — EMERGENCY DEPARTMENT REPORT
Blank Doc





- Documentation


Documentation: 





41-year-old male with suicide ideation with plan and psychosis currently on 1013

awaiting placement.  No psychiatric meds initiated at this time

## 2022-04-20 VITALS — SYSTOLIC BLOOD PRESSURE: 132 MMHG | DIASTOLIC BLOOD PRESSURE: 81 MMHG

## 2022-04-20 NOTE — PROGRESS NOTE
Subjective





- Reason for Consult


Consult date: 04/20/22


Reason for consult: suicidal ideation





- Chief Complaint


Chief complaint: 


The patient was seen this morning. He is calm, alert and oriented x3. The 

patient states he is doing well and ready to be discharged. he denies any 

current suicidal/homicidal ideation and denies hallucinations. 





REVIEW OF SYSTEMS  


Constitutional: Negative for weight loss


ENT: Negative for stridor


Respiratory: Negative for cough or hemoptysis


All other systems reviewed and are negative





MENTAL STATUS EXAMINATION


General Appearance and Behavior: Age appropriate, good hygiene, wearing 

appropriate clothes. calm, cooperative


Cooperation: Cooperative


Psychomotor Behavior: Psychomotor normal


Mood: ok


Affect and affective range: Congruent with stated mood


Thought Process: goal directed


Thought Content: reality oriented


Speech: normal tone and pace


Suicidal Ideation: Denies


Homicidal Ideation:Denies


Hallucinations:Denies


Delusions: None elicited


Impulse Control: limited


Insight and Judgment: Limited


Memory: Limited


Attention: attentive


Orientation: a/o x 2





 Assessment 


(1) Schizophrenia


Current Visit: Yes   Status: Acute





Treatment Plan


 [id number]





ContinueRisperidone 2mg po QHS





Risks, benefits and alternatives of medications discussed with the patient, 

questions answered and consent obtained from patient.


PSYCHOTHERAPY: Supportive psychotherapy provided


MEDICAL: Per primary team


DELIRIUM PRECAUTIONS: Please re-orient patient frequently, keep lights on during

the day, and minimize benzodiazepines and opiates as these medications could 

worsen patient's confusion.


SAFETY SITTER: Defer to primary


DISPOSITION: Do not recommend acute inpatient psychiatric hospitalization at 

this time.  will provide patient with psychiatric out patient resources.


FOLLOW-UP: Will sign off. Thanks


Case staffed with Dr. Boudreaux





Medications and Allergies


                                        





Mental Status Exam





- Vital signs


                                Last Vital Signs











Temp  98.3 F   04/20/22 08:29


 


Pulse  75   04/20/22 08:29


 


Resp  18   04/20/22 08:29


 


BP  132/81   04/20/22 08:29


 


Pulse Ox  100   04/20/22 08:29

## 2022-06-20 ENCOUNTER — HOSPITAL ENCOUNTER (EMERGENCY)
Dept: HOSPITAL 5 - ED | Age: 42
Discharge: HOME | End: 2022-06-20
Payer: MEDICAID

## 2022-06-20 VITALS — SYSTOLIC BLOOD PRESSURE: 136 MMHG | DIASTOLIC BLOOD PRESSURE: 91 MMHG

## 2022-06-20 DIAGNOSIS — F20.9: ICD-10-CM

## 2022-06-20 DIAGNOSIS — G40.909: Primary | ICD-10-CM

## 2022-06-20 DIAGNOSIS — E11.9: ICD-10-CM

## 2022-06-20 DIAGNOSIS — F31.9: ICD-10-CM

## 2022-06-20 LAB
ALBUMIN SERPL-MCNC: 4.4 G/DL (ref 3.9–5)
ALT SERPL-CCNC: 25 UNITS/L (ref 7–56)
BASOPHILS # (AUTO): 0 K/MM3 (ref 0–0.1)
BASOPHILS NFR BLD AUTO: 0.8 % (ref 0–1.8)
BILIRUB DIRECT SERPL-MCNC: < 0.2 MG/DL (ref 0–0.2)
BUN SERPL-MCNC: 11 MG/DL (ref 9–20)
BUN/CREAT SERPL: 18 %
CALCIUM SERPL-MCNC: 9 MG/DL (ref 8.4–10.2)
EOSINOPHIL # BLD AUTO: 0.1 K/MM3 (ref 0–0.4)
EOSINOPHIL NFR BLD AUTO: 1.9 % (ref 0–4.3)
HCT VFR BLD CALC: 43.8 % (ref 35.5–45.6)
HEMOLYSIS INDEX: 19
HGB BLD-MCNC: 14.7 GM/DL (ref 11.8–15.2)
LYMPHOCYTES # BLD AUTO: 1.9 K/MM3 (ref 1.2–5.4)
LYMPHOCYTES NFR BLD AUTO: 47.4 % (ref 13.4–35)
MCHC RBC AUTO-ENTMCNC: 34 % (ref 32–34)
MCV RBC AUTO: 88 FL (ref 84–94)
MONOCYTES # (AUTO): 0.3 K/MM3 (ref 0–0.8)
MONOCYTES % (AUTO): 7.4 % (ref 0–7.3)
MUCOUS THREADS #/AREA URNS HPF: (no result) /HPF
PH UR STRIP: 5 [PH] (ref 5–7)
PLATELET # BLD: 157 K/MM3 (ref 140–440)
RBC # BLD AUTO: 4.98 M/MM3 (ref 3.65–5.03)
RBC #/AREA URNS HPF: < 1 /HPF (ref 0–6)
UROBILINOGEN UR-MCNC: < 2 MG/DL (ref ?–2)
WBC #/AREA URNS HPF: 2 /HPF (ref 0–6)

## 2022-06-20 PROCEDURE — 36415 COLL VENOUS BLD VENIPUNCTURE: CPT

## 2022-06-20 PROCEDURE — 85025 COMPLETE CBC W/AUTO DIFF WBC: CPT

## 2022-06-20 PROCEDURE — 81001 URINALYSIS AUTO W/SCOPE: CPT

## 2022-06-20 PROCEDURE — 80076 HEPATIC FUNCTION PANEL: CPT

## 2022-06-20 PROCEDURE — 80307 DRUG TEST PRSMV CHEM ANLYZR: CPT

## 2022-06-20 PROCEDURE — 96374 THER/PROPH/DIAG INJ IV PUSH: CPT

## 2022-06-20 PROCEDURE — 80048 BASIC METABOLIC PNL TOTAL CA: CPT

## 2022-06-20 PROCEDURE — 99284 EMERGENCY DEPT VISIT MOD MDM: CPT

## 2022-06-20 NOTE — EMERGENCY DEPARTMENT REPORT
ED Seizure HPI





- General


Chief Complaint: Seizure


Stated Complaint: SEIZURE


Time Seen by Provider: 06/20/22 15:32


Source: patient, EMS


Mode of arrival: Stretcher


Limitations: No Limitations





- History of Present Illness


Initial Comments: 





Patient is 42 years old male with history of seizure and schizophrenia.  Patient

brought to the emergency room via EMS for evaluation of seizure.  Patient had 

seizure in one of his case management office.  EMS also reported that after 

getting closer to the ER he had another generalized tonic-clonic seizure that 

aborted by itself.  Patient is taking Keppra.  He stated that he is compliant 

with his medication but he said he missed 1 pill.  Patient denied any fever or 

chills.  No nausea or vomiting.  No headache.  No injury.  Patient also denied 

any suicidal homicidal ideation.  No auditory or visual hallucination.


MD Complaint: seizure


-: Sudden, This afternoon


Description of Episode: loss of consciousness, tonic-clonic movement


Witnessed:: Yes


Trauma: No


Seizure History: known seizure disorder


Possible Precipitating Event: none


Associated Symptoms: denies other symptoms


Treatments Prior to Arrival: none





- Related Data


                                Home Medications











 Medication  Instructions  Recorded  Confirmed  Last Taken


 


risperiDONE [RisperDAL] 1 mg PO QHS 03/29/18 12/28/21 Unknown








                                  Previous Rx's











 Medication  Instructions  Recorded  Last Taken  Type


 


cephALEXin [Keflex] 500 mg PO Q12HR #14 cap 12/29/21 Unknown Rx


 


risperiDONE [RisperDAL] 2 mg PO QHS #30 02/12/22 Unknown Rx


 


Butalb/Acetamin/Caff -40 2 tab PO Q8HR PRN #10 tablet 03/12/22 Unknown Rx





[Fioricet -40]    


 


risperiDONE [RisperDAL] 2 mg PO QHS 30 Days #30 04/20/22 Unknown Rx


 


levETIRAcetam [Keppra TAB] 500 mg PO BID #60 tablet 06/20/22 Unknown Rx











                                    Allergies











Allergy/AdvReac Type Severity Reaction Status Date / Time


 


No Known Allergies Allergy   Verified 04/19/22 12:18














ED Review of Systems


ROS: 


Stated complaint: SEIZURE


Other details as noted in HPI





Comment: All other systems reviewed and negative


Constitutional: denies: chills, fever


Respiratory: denies: cough, shortness of breath


Cardiovascular: denies: chest pain, palpitations


Gastrointestinal: denies: abdominal pain, nausea, vomiting


Musculoskeletal: denies: back pain


Neurological: denies: headache, weakness, numbness, paresthesias, confusion, abn

ormal gait


Psychiatric: denies: depression, auditory hallucinations, visual hallucinations,

homicidal thoughts, suicidal thoughts





ED Past Medical Hx





- Past Medical History


Previous Medical History?: Yes


Hx Congestive Heart Failure: No


Hx Diabetes: Yes


Hx Arthritis: No


Hx Seizures: Yes


Hx Psychiatric Treatment: Yes (bipolar/schizo)


Hx Asthma: No


Hx COPD: No





- Surgical History


Past Surgical History?: No





- Social History


Smoking Status: Never Smoker


Substance Use Type: Alcohol





- Medications


Home Medications: 


                                Home Medications











 Medication  Instructions  Recorded  Confirmed  Last Taken  Type


 


risperiDONE [RisperDAL] 1 mg PO QHS 03/29/18 12/28/21 Unknown History


 


cephALEXin [Keflex] 500 mg PO Q12HR #14 cap 12/29/21  Unknown Rx


 


risperiDONE [RisperDAL] 2 mg PO QHS #30 02/12/22  Unknown Rx


 


Butalb/Acetamin/Caff -40 2 tab PO Q8HR PRN #10 tablet 03/12/22  Unknown Rx





[Fioricet -40]     


 


risperiDONE [RisperDAL] 2 mg PO QHS 30 Days #30 04/20/22  Unknown Rx


 


levETIRAcetam [Keppra TAB] 500 mg PO BID #60 tablet 06/20/22  Unknown Rx














ED Physical Exam





- General


Limitations: No Limitations


General appearance: alert, in no apparent distress





- Head


Head exam: Present: atraumatic, normocephalic, normal inspection





- Eye


Eye exam: Present: normal appearance





- ENT


ENT exam: Present: normal exam, normal orophraynx, mucous membranes moist





- Neck


Neck exam: Present: normal inspection, full ROM.  Absent: tenderness, meningis

mus





- Respiratory


Respiratory exam: Present: normal lung sounds bilaterally





- Cardiovascular


Cardiovascular Exam: Present: regular rate, normal rhythm, normal heart sounds





- GI/Abdominal


GI/Abdominal exam: Present: soft, normal bowel sounds.  Absent: distended, 

tenderness, guarding, rebound, rigid, organomegaly, mass, bruit, pulsatile mass,

hernia





- Extremities Exam


Extremities exam: Present: normal inspection, full ROM, normal capillary refill.

 Absent: tenderness





- Back Exam


Back exam: Present: normal inspection, full ROM.  Absent: CVA tenderness (R), 

CVA tenderness (L)





- Neurological Exam


Neurological exam: Present: alert, oriented X3, CN II-XII intact, normal gait, 

reflexes normal.  Absent: motor sensory deficit





- Psychiatric


Psychiatric exam: Present: normal mood.  Absent: homicidal ideation, suicidal 

ideation





- Skin


Skin exam: Present: warm, intact, normal color





ED Course


                                   Vital Signs











  06/20/22 06/20/22 06/20/22





  13:53 14:34 14:46


 


Temperature 98.1 F 98.3 F 


 


Pulse Rate 69 67 63


 


Respiratory 18 15 13





Rate   


 


Blood Pressure 155/104  127/95


 


Blood Pressure  127/95 





[Right]   


 


O2 Sat by Pulse 98 97 98





Oximetry   














  06/20/22 06/20/22 06/20/22





  15:00 15:04 15:16


 


Temperature   


 


Pulse Rate 63 55 L 57 L


 


Respiratory 13 11 L 11 L





Rate   


 


Blood Pressure 132/90  137/88


 


Blood Pressure  137/88 





[Right]   


 


O2 Sat by Pulse 97 95 97





Oximetry   














  06/20/22 06/20/22 06/20/22





  15:30 15:46 16:00


 


Temperature   


 


Pulse Rate 61 63 60


 


Respiratory 17 16 11 L





Rate   


 


Blood Pressure 92/36 132/90 127/82


 


Blood Pressure   





[Right]   


 


O2 Sat by Pulse 96 98 99





Oximetry   














  06/20/22 06/20/22 06/20/22





  16:16 16:26 16:30


 


Temperature   


 


Pulse Rate 64 67 66


 


Respiratory 11 L 14 11 L





Rate   


 


Blood Pressure 135/81  131/88


 


Blood Pressure  131/88 





[Right]   


 


O2 Sat by Pulse 99 99 99





Oximetry   














  06/20/22 06/20/22 06/20/22





  16:46 17:00 17:16


 


Temperature   


 


Pulse Rate 66 77 74


 


Respiratory 16 16 17





Rate   


 


Blood Pressure 134/98 139/86 139/86


 


Blood Pressure   





[Right]   


 


O2 Sat by Pulse 99 97 98





Oximetry   














  06/20/22 06/20/22 06/20/22





  17:20 18:09 19:05


 


Temperature   


 


Pulse Rate  77 94 H


 


Respiratory 15 15 15





Rate   


 


Blood Pressure   


 


Blood Pressure 132/90 143/88 136/91





[Right]   


 


O2 Sat by Pulse 99 98 100





Oximetry   














ED Medical Decision Making





- Lab Data


Result diagrams: 


                                 06/20/22 17:17





                                 06/20/22 17:17





- Medical Decision Making





Patient is 42 years old male with history of seizure and schizophrenia.  Patient

 brought to the emergency room via EMS for evaluation of seizure.  Patient had 

seizure in one of his case management office.  EMS also reported that after 

getting closer to the ER he had another generalized tonic-clonic seizure that 

aborted by itself.  Patient is taking Keppra.  He stated that he is compliant 

with his medication but he said he missed 1 pill.  Patient denied any fever or 

chills.  No nausea or vomiting.  No headache.  No injury.  Patient also denied 

any suicidal homicidal ideation.  No auditory or visual hallucination.





Patient received 1 g of Keppra IV.  Labs reviewed and is unremarkable.  Patient 

remained stable and seizure-free.  Patient advised to be compliant with his 

medication and advised to follow-up with his neurologist in the next 2 to 3 days

 and to return to the ER if he develop any new symptoms.


Critical care attestation.: 


If time is entered above; I have spent that time in minutes in the direct care 

of this critically ill patient, excluding procedure time.








ED Disposition


Clinical Impression: 


 Seizure





Disposition: 01 HOME / SELF CARE / HOMELESS


Is pt being admited?: No


Condition: Stable


Instructions:  Seizure, Adult


Referrals: 


BAYLEE BARROS MD [Primary Care Provider] - 3-5 Days

## 2022-07-24 ENCOUNTER — HOSPITAL ENCOUNTER (EMERGENCY)
Dept: HOSPITAL 5 - ED | Age: 42
Discharge: HOME | End: 2022-07-24
Payer: MEDICAID

## 2022-07-24 VITALS — DIASTOLIC BLOOD PRESSURE: 78 MMHG | SYSTOLIC BLOOD PRESSURE: 136 MMHG

## 2022-07-24 DIAGNOSIS — E11.9: ICD-10-CM

## 2022-07-24 DIAGNOSIS — F31.9: ICD-10-CM

## 2022-07-24 DIAGNOSIS — R56.9: Primary | ICD-10-CM

## 2022-07-24 LAB
BASOPHILS # (AUTO): 0 K/MM3 (ref 0–0.1)
BASOPHILS NFR BLD AUTO: 0.7 % (ref 0–1.8)
BUN SERPL-MCNC: 11 MG/DL (ref 9–20)
BUN/CREAT SERPL: 15 %
CALCIUM SERPL-MCNC: 9.1 MG/DL (ref 8.4–10.2)
EOSINOPHIL # BLD AUTO: 0 K/MM3 (ref 0–0.4)
EOSINOPHIL NFR BLD AUTO: 1.2 % (ref 0–4.3)
HCT VFR BLD CALC: 40.8 % (ref 35.5–45.6)
HEMOLYSIS INDEX: 6
HGB BLD-MCNC: 13.4 GM/DL (ref 11.8–15.2)
LYMPHOCYTES # BLD AUTO: 1.9 K/MM3 (ref 1.2–5.4)
LYMPHOCYTES NFR BLD AUTO: 44.6 % (ref 13.4–35)
MCHC RBC AUTO-ENTMCNC: 33 % (ref 32–34)
MCV RBC AUTO: 89 FL (ref 84–94)
MONOCYTES # (AUTO): 0.3 K/MM3 (ref 0–0.8)
MONOCYTES % (AUTO): 8 % (ref 0–7.3)
PLATELET # BLD: 168 K/MM3 (ref 140–440)
RBC # BLD AUTO: 4.61 M/MM3 (ref 3.65–5.03)

## 2022-07-24 PROCEDURE — 83735 ASSAY OF MAGNESIUM: CPT

## 2022-07-24 PROCEDURE — 80048 BASIC METABOLIC PNL TOTAL CA: CPT

## 2022-07-24 PROCEDURE — 96374 THER/PROPH/DIAG INJ IV PUSH: CPT

## 2022-07-24 PROCEDURE — 99284 EMERGENCY DEPT VISIT MOD MDM: CPT

## 2022-07-24 PROCEDURE — 36415 COLL VENOUS BLD VENIPUNCTURE: CPT

## 2022-07-24 PROCEDURE — 85025 COMPLETE CBC W/AUTO DIFF WBC: CPT

## 2022-07-24 NOTE — EMERGENCY DEPARTMENT REPORT
HPI





- General


Chief Complaint: Seizure


Time Seen by Provider: 07/24/22 18:36





- HPI


HPI: 


Room 22











The patient is a 41-year-old male present with chief complaint of seizures.  

Patient has a history of seizures states he has been compliant with his Keppra. 

Patient had a generalized tonic-clonic seizure today prompting him to come to 

the emergency department.  Patient now complains of a headache which he says he 

normally gets after seizures.  Patient cannot recall the last seizure he had 

before today








ED Past Medical Hx





- Past Medical History


Hx Diabetes: Yes


Hx Seizures: Yes


Hx Psychiatric Treatment: Yes (bipolar/schizo)





- Surgical History


Past Surgical History?: No





- Family History


Family history: no significant





- Social History


Smoking Status: Never Smoker


Substance Use Type: None (Denies illicit drug use)





- Medications


Home Medications: 


                                Home Medications











 Medication  Instructions  Recorded  Confirmed  Last Taken  Type


 


risperiDONE [RisperDAL] 1 mg PO QHS 03/29/18 12/28/21 Unknown History


 


cephALEXin [Keflex] 500 mg PO Q12HR #14 cap 12/29/21  Unknown Rx


 


risperiDONE [RisperDAL] 2 mg PO QHS #30 02/12/22  Unknown Rx


 


Butalb/Acetamin/Caff -40 2 tab PO Q8HR PRN #10 tablet 03/12/22  Unknown Rx





[Fioricet -40]     


 


risperiDONE [RisperDAL] 2 mg PO QHS 30 Days #30 04/20/22  Unknown Rx


 


levETIRAcetam [Keppra TAB] 500 mg PO BID #60 tablet 07/24/22  Unknown Rx














ED Review of Systems


ROS: 


Stated complaint: SEIZURE


Other details as noted in HPI





Constitutional: no symptoms reported


Eyes: denies: eye pain


ENT: denies: throat pain


Respiratory: no symptoms reported


Cardiovascular: denies: chest pain


Endocrine: no symptoms reported


Gastrointestinal: denies: abdominal pain


Genitourinary: denies: dysuria


Musculoskeletal: denies: back pain


Neurological: headache





Physical Exam





- Physical Exam


Vital Signs: 


                                   Vital Signs











  07/24/22





  18:26


 


Temperature 99.3 F


 


Pulse Rate 93 H


 


Respiratory 18





Rate 


 


Blood Pressure 153/94





[Left] 


 


O2 Sat by Pulse 98





Oximetry 











Physical Exam: 





GENERAL: The patient is well-developed well-nourished male lying on stretcher 

not appearing to be in acute distress. []


HEENT: Normocephalic.  Atraumatic.  Extraocular motions are intact.  Patient has

 moist mucous membranes.


NECK: Supple.  No meningitic signs are noted.  Trachea midline


CHEST/LUNGS: Clear to auscultation.  There is no respiratory distress noted.


HEART/CARDIOVASCULAR: Regular.  There is no tachycardia.  There is no gallop rub

 or murmur.


ABDOMEN: Abdomen is soft, nontender.  Patient has normal bowel sounds.  There is

 no abdominal distention.


SKIN: There is no rash.  There is no edema.  There is no diaphoresis.


NEURO: The patient is awake, alert, and oriented.  The patient is cooperative.  

The patient has no focal neurologic deficits.  The patient has normal speech.  

Cranial nerves II through XII grossly intact.  GCS 15


MUSCULOSKELETAL: There is no evidence of acute injury.





ED Course


                                   Vital Signs











  07/24/22





  18:26


 


Temperature 99.3 F


 


Pulse Rate 93 H


 


Respiratory 18





Rate 


 


Blood Pressure 153/94





[Left] 


 


O2 Sat by Pulse 98





Oximetry 














ED Medical Decision Making





- Lab Data


Result diagrams: 


                                 07/24/22 18:53





                                 07/24/22 18:53





                                Laboratory Tests











  07/24/22 07/24/22





  18:53 18:53


 


WBC  4.3 L 


 


RBC  4.61 


 


Hgb  13.4 


 


Hct  40.8 


 


MCV  89 


 


MCH  29 


 


MCHC  33 


 


RDW  14.2 


 


Plt Count  168 


 


Lymph % (Auto)  44.6 H 


 


Mono % (Auto)  8.0 H 


 


Eos % (Auto)  1.2 


 


Baso % (Auto)  0.7 


 


Lymph # (Auto)  1.9 


 


Mono # (Auto)  0.3 


 


Eos # (Auto)  0.0 


 


Baso # (Auto)  0.0 


 


Seg Neutrophils %  45.5 


 


Seg Neutrophils #  1.9 


 


Sodium   145


 


Potassium   3.7


 


Chloride   107.4 H


 


Carbon Dioxide   24


 


Anion Gap   17


 


BUN   11


 


Creatinine   0.8


 


Estimated GFR   > 60


 


BUN/Creatinine Ratio   15


 


Glucose   244 H


 


Calcium   9.1


 


Magnesium   2.00














- Differential Diagnosis


Seizure


Critical care attestation.: 


If time is entered above; I have spent that time in minutes in the direct care 

of this critically ill patient, excluding procedure time.








ED Disposition


Clinical Impression: 


 Seizure





Disposition: 01 HOME / SELF CARE / HOMELESS


Is pt being admited?: No


Does the pt Need Aspirin: No


Condition: Stable


Additional Instructions: 


Return to the emergency department should you develop worsening symptoms, 

inability to tolerate food or liquids, high fever or any other concerns


Prescriptions: 


levETIRAcetam [Keppra TAB] 500 mg PO BID #60 tablet


Referrals: 


TAN HENRY MD [Staff Physician] - 3-5 Days (Dr. Henry is a neurologist.  Please 

follow-up with him if you do not already have a neurologist.)


Time of Disposition: 19:29

## 2022-08-25 ENCOUNTER — HOSPITAL ENCOUNTER (EMERGENCY)
Dept: HOSPITAL 5 - ED | Age: 42
LOS: 1 days | Discharge: HOME | End: 2022-08-26
Payer: MEDICAID

## 2022-08-25 VITALS — SYSTOLIC BLOOD PRESSURE: 148 MMHG | DIASTOLIC BLOOD PRESSURE: 79 MMHG

## 2022-08-25 DIAGNOSIS — E11.9: ICD-10-CM

## 2022-08-25 DIAGNOSIS — R56.9: Primary | ICD-10-CM

## 2022-08-25 DIAGNOSIS — F31.9: ICD-10-CM

## 2022-08-25 LAB
ALBUMIN SERPL-MCNC: 4.7 G/DL (ref 3.9–5)
ALT SERPL-CCNC: 21 UNITS/L (ref 7–56)
BENZODIAZEPINES SCREEN,URINE: (no result)
BUN SERPL-MCNC: 17 MG/DL (ref 9–20)
BUN/CREAT SERPL: 24 %
CALCIUM SERPL-MCNC: 10 MG/DL (ref 8.4–10.2)
HCT VFR BLD CALC: 42.9 % (ref 35.5–45.6)
HEMOLYSIS INDEX: 17
HGB BLD-MCNC: 14.7 GM/DL (ref 11.8–15.2)
METHADONE SCREEN,URINE: (no result)
OPIATE SCREEN,URINE: (no result)
RBC #/AREA URNS HPF: < 1 /HPF (ref 0–6)
WBC #/AREA URNS HPF: < 1 /HPF (ref 0–6)

## 2022-08-25 PROCEDURE — 80307 DRUG TEST PRSMV CHEM ANLYZR: CPT

## 2022-08-25 PROCEDURE — 93005 ELECTROCARDIOGRAM TRACING: CPT

## 2022-08-25 PROCEDURE — G0480 DRUG TEST DEF 1-7 CLASSES: HCPCS

## 2022-08-25 PROCEDURE — 36415 COLL VENOUS BLD VENIPUNCTURE: CPT

## 2022-08-25 PROCEDURE — 83735 ASSAY OF MAGNESIUM: CPT

## 2022-08-25 PROCEDURE — 81001 URINALYSIS AUTO W/SCOPE: CPT

## 2022-08-25 PROCEDURE — 85018 HEMOGLOBIN: CPT

## 2022-08-25 PROCEDURE — 80320 DRUG SCREEN QUANTALCOHOLS: CPT

## 2022-08-25 PROCEDURE — 99284 EMERGENCY DEPT VISIT MOD MDM: CPT

## 2022-08-25 PROCEDURE — 82550 ASSAY OF CK (CPK): CPT

## 2022-08-25 PROCEDURE — 85014 HEMATOCRIT: CPT

## 2022-08-25 PROCEDURE — 80053 COMPREHEN METABOLIC PANEL: CPT

## 2022-08-25 NOTE — EMERGENCY DEPARTMENT REPORT
ED General Adult HPI





- General


Chief complaint: Seizure


Stated complaint: Possible seizure


Time Seen by Provider: 08/25/22 20:30


Source: patient, EMS ( EMS documentation not available at time of chart 

dictation ), RN notes reviewed, old records reviewed


Mode of arrival: Stretcher


Limitations: No Limitations





- History of Present Illness


Initial comments: 





The patient was evaluated in the emergency department for symptoms described in 

the history of present illness.  He/she was evaluated in the context of the 

global COVID-19 pandemic, which necessitated consideration that the patient 

might be at risk for infection with the virus that causes COVID-19.  

Institutional protocols and algorithms that pertain to the evaluation of 

patients at risk for COVID-19 are in a state of rapid change based on 

information released by regulatory bodies including the CDC and federal and 

state organizations.  These policies and algorithms were followed during the pa

jake's care in the emergency department.  Please note that these policies, 

procedures and recommendations changed on a rapid basis.





The patient is a 41-year-old gentleman, who presents to the department today 

with a complaint of possibly having had a seizure.  He is intermittently 

compliant with Keppra.  He denies physical pain.


He is not homicidal or suicidal.  He denies additional injuries and complaints.


-: Sudden


Severity scale (0 -10): 0


Consistency: now resolved


Improves with: none


Worsens with: none


Associated Symptoms: denies other symptoms





- Related Data


                                Home Medications











 Medication  Instructions  Recorded  Confirmed  Last Taken


 


risperiDONE [RisperDAL] 1 mg PO QHS 03/29/18 12/28/21 Unknown








                                  Previous Rx's











 Medication  Instructions  Recorded  Last Taken  Type


 


cephALEXin [Keflex] 500 mg PO Q12HR #14 cap 12/29/21 Unknown Rx


 


risperiDONE [RisperDAL] 2 mg PO QHS #30 02/12/22 Unknown Rx


 


risperiDONE [RisperDAL] 2 mg PO QHS 30 Days #30 04/20/22 Unknown Rx


 


levETIRAcetam [Keppra TAB] 500 mg PO BID #60 tablet 08/25/22 Unknown Rx











                                    Allergies











Allergy/AdvReac Type Severity Reaction Status Date / Time


 


No Known Allergies Allergy   Verified 08/25/22 18:23














ED Review of Systems


ROS: 


Stated complaint: POSS SX/BEHAVIORL EPISODE


Other details as noted in HPI





Comment: All other systems reviewed and negative


Neurological: other (Possible seizure)





ED Past Medical Hx





- Past Medical History


Hx Diabetes: Yes


Hx Seizures: Yes


Hx Psychiatric Treatment: Yes (bipolar/schizo)





- Social History


Smoking Status: Never Smoker


Substance Use Type: None (Denies illicit drug use)





- Medications


Home Medications: 


                                Home Medications











 Medication  Instructions  Recorded  Confirmed  Last Taken  Type


 


risperiDONE [RisperDAL] 1 mg PO QHS 03/29/18 12/28/21 Unknown History


 


cephALEXin [Keflex] 500 mg PO Q12HR #14 cap 12/29/21  Unknown Rx


 


risperiDONE [RisperDAL] 2 mg PO QHS #30 02/12/22  Unknown Rx


 


risperiDONE [RisperDAL] 2 mg PO QHS 30 Days #30 04/20/22  Unknown Rx


 


levETIRAcetam [Keppra TAB] 500 mg PO BID #60 tablet 08/25/22  Unknown Rx














ED Physical Exam





- General


Limitations: No Limitations


General appearance: alert, in no apparent distress, obese





- Head


Head exam: Present: atraumatic, normocephalic





- Eye


Eye exam: Present: normal appearance, EOMI





- ENT


ENT exam: Present: normal exam, normal orophraynx, mucous membranes moist, 

normal external ear exam





- Neck


Neck exam: Present: normal inspection, full ROM.  Absent: tenderness, 

meningismus





- Respiratory


Respiratory exam: Present: normal lung sounds bilaterally.  Absent: respiratory 

distress, wheezes, rales, rhonchi, stridor, decreased breath sounds





- Cardiovascular


Cardiovascular Exam: Present: regular rate, normal rhythm, normal heart sounds. 

Absent: bradycardia, tachycardia, irregular rhythm, systolic murmur, diastolic 

murmur, rubs, gallop





- GI/Abdominal


GI/Abdominal exam: Present: soft.  Absent: distended, tenderness, guarding, 

rebound, rigid, pulsatile mass





- Rectal


Rectal exam: Present: deferred





- Extremities Exam


Extremities exam: Present: normal inspection, full ROM, other (2+ pulses noted 

in the bilateral upper and lower extremities.  There is no palpable cord.   

negative Homans sign.  Muscular compartments are soft.  The pelvis is stable.). 

Absent: pedal edema, calf tenderness





- Back Exam


Back exam: Present: normal inspection.  Absent: tenderness, CVA tenderness (R), 

CVA tenderness (L), paraspinal tenderness, vertebral tenderness





- Neurological Exam


Neurological exam: Present: alert, oriented X3, normal gait, other (No facial 

droop.  Tongue midline.  Extraocular movements intact bilaterally.  Facial 

sensation intact to light touch in V1, V2, V3 distribution bilaterally.  5 and a

5 strength in 4 extremities.  Sensation intact to light touch in 4 

extremities.).  Absent: motor sensory deficit





- Psychiatric


Psychiatric exam: Present: normal mood, flat affect.  Absent: homicidal 

ideation, suicidal ideation





- Skin


Skin exam: Present: warm, dry, intact, normal color.  Absent: rash





ED Course


                                   Vital Signs











  08/25/22





  17:57


 


Temperature 97.1 F L


 


Pulse Rate 98 H


 


Respiratory 18





Rate 


 


Blood Pressure 148/79





[Left] 


 


O2 Sat by Pulse 97





Oximetry 














ED Medical Decision Making





- Lab Data


Result diagrams: 


                                 08/25/22 21:14





                                 08/25/22 21:14








                                   Vital Signs











  08/25/22





  17:57


 


Temperature 97.1 F L


 


Pulse Rate 98 H


 


Respiratory 18





Rate 


 


Blood Pressure 148/79





[Left] 


 


O2 Sat by Pulse 97





Oximetry 











                                   Lab Results











  08/25/22 08/25/22 08/25/22 Range/Units





  21:14 21:14 21:14 


 


Hgb  14.7    (11.8-15.2)  gm/dl


 


Hct  42.9    (35.5-45.6)  %


 


Sodium   137   (137-145)  mmol/L


 


Potassium   3.9   (3.6-5.0)  mmol/L


 


Chloride   101.1   ()  mmol/L


 


Carbon Dioxide   23   (22-30)  mmol/L


 


Anion Gap   17   mmol/L


 


BUN   17   (9-20)  mg/dL


 


Creatinine   0.7 L   (0.8-1.3)  mg/dL


 


Estimated GFR   > 60   ml/min


 


BUN/Creatinine Ratio   24   %


 


Glucose   86   ()  mg/dL


 


Calcium   10.0   (8.4-10.2)  mg/dL


 


Magnesium   1.90   (1.7-2.3)  mg/dL


 


Total Bilirubin   0.30   (0.1-1.2)  mg/dL


 


AST   19   (5-40)  units/L


 


ALT   21   (7-56)  units/L


 


Alkaline Phosphatase   61   ()  units/L


 


Total Creatine Kinase   154   ()  units/L


 


Total Protein   7.3   (6.3-8.2)  g/dL


 


Albumin   4.7   (3.9-5)  g/dL


 


Albumin/Globulin Ratio   1.8   %


 


Urine Color     (Yellow)  


 


Urine Turbidity     (Clear)  


 


Specific Gravity (Man)     (1.003-1.030)  


 


Ur Protein (Man)     (Negative)  mg/dL


 


Ur Ketones (Man)     (Negative)  


 


Ur Nitrite (Man)     (Negative)  


 


Ur Reducing Substances     


 


Urine Bilirubin (Man)     (Negative)  


 


Leukocyte Esterase (Man)     (Negative)  


 


Urine WBC (Auto)     (0.0-6.0)  /HPF


 


Urine RBC (Auto)     (0.0-6.0)  /HPF


 


U Epithel Cells (Auto)     (0-13.0)  /HPF


 


Urine RBC (Manual)     (Negative)  


 


Salicylates    < 0.3 L  (2.8-20.0)  mg/dL


 


Urine Opiates Screen     


 


Urine Methadone Screen     


 


Acetaminophen     (10.0-30.0)  ug/mL


 


Ur Barbiturates Screen     


 


Ur Phencyclidine Scrn     


 


Ur Amphetamines Screen     


 


U Benzodiazepines Scrn     


 


Urine Cocaine Screen     


 


U Marijuana (THC) Screen     


 


Drugs of Abuse Note     














  08/25/22 08/25/22 08/25/22 Range/Units





  21:14 Unknown Unknown 


 


Hgb     (11.8-15.2)  gm/dl


 


Hct     (35.5-45.6)  %


 


Sodium     (137-145)  mmol/L


 


Potassium     (3.6-5.0)  mmol/L


 


Chloride     ()  mmol/L


 


Carbon Dioxide     (22-30)  mmol/L


 


Anion Gap     mmol/L


 


BUN     (9-20)  mg/dL


 


Creatinine     (0.8-1.3)  mg/dL


 


Estimated GFR     ml/min


 


BUN/Creatinine Ratio     %


 


Glucose     ()  mg/dL


 


Calcium     (8.4-10.2)  mg/dL


 


Magnesium     (1.7-2.3)  mg/dL


 


Total Bilirubin     (0.1-1.2)  mg/dL


 


AST     (5-40)  units/L


 


ALT     (7-56)  units/L


 


Alkaline Phosphatase     ()  units/L


 


Total Creatine Kinase     ()  units/L


 


Total Protein     (6.3-8.2)  g/dL


 


Albumin     (3.9-5)  g/dL


 


Albumin/Globulin Ratio     %


 


Urine Color   Colorless   (Yellow)  


 


Urine Turbidity   Clear   (Clear)  


 


Specific Gravity (Man)   1.020   (1.003-1.030)  


 


Ur Protein (Man)   Negative   (Negative)  mg/dL


 


Ur Ketones (Man)   3+   (Negative)  


 


Ur Nitrite (Man)   Negative   (Negative)  


 


Ur Reducing Substances   Not Reportable   


 


Urine Bilirubin (Man)   Negative   (Negative)  


 


Leukocyte Esterase (Man)   Negative   (Negative)  


 


Urine WBC (Auto)   < 1.0   (0.0-6.0)  /HPF


 


Urine RBC (Auto)   < 1.0   (0.0-6.0)  /HPF


 


U Epithel Cells (Auto)   < 1.0   (0-13.0)  /HPF


 


Urine RBC (Manual)   Negative   (Negative)  


 


Salicylates     (2.8-20.0)  mg/dL


 


Urine Opiates Screen    Presumptive negative  


 


Urine Methadone Screen    Presumptive negative  


 


Acetaminophen  5.0 L    (10.0-30.0)  ug/mL


 


Ur Barbiturates Screen    Presumptive negative  


 


Ur Phencyclidine Scrn    Presumptive negative  


 


Ur Amphetamines Screen    Presumptive negative  


 


U Benzodiazepines Scrn    Presumptive negative  


 


Urine Cocaine Screen    Presumptive negative  


 


U Marijuana (THC) Screen    Presumptive negative  


 


Drugs of Abuse Note    Disclamer  














- EKG Data


-: EKG Interpreted by Me


EKG shows normal: sinus rhythm


Rate: normal





- EKG Data





08/25/22 22:31


The EKG is interpreted at 20: 40





Sinus rhythm, bradycardia, rate 50 bpm.  Normal axis, normal P wave axis, normal

 intervals.  Unremarkable EKG.  Not a STEMI





- Medical Decision Making





Differential diagnosis, include but not limited to: Seizure, electrolyte 

derangement, conversion disorder, medical screening examination





Assessment and plan: 41-year-old gentleman, who is clinically sober, with a GCS 

of 15, who is ambulatory with a steady gait, who presents to the department 

today with a complaint of possibly having had a seizure.  His physical exam is 

benign, and noncontributory.  He has not had a seizure for many hours that he 

has been here in the emergency room.  He does not meet criteria for 1013 hold or

 involuntary confinement.  He was observed in the ER for hours without clinical 

decompensation.  He is advised to not drive or operate motor vehicles for the 

next 6 months.  His laboratory studies are nonactionable.  His EKG is 

essentially unremarkable.  He may be discharged to continue current outpatient 

Keppra therapy, and follow-up as an outpatient


Critical care attestation.: 


If time is entered above; I have spent that time in minutes in the direct care 

of this critically ill patient, excluding procedure time.








ED Disposition


Clinical Impression: 


 History of seizure





Disposition: 01 HOME / SELF CARE / HOMELESS


Is pt being admited?: No


Does the pt Need Aspirin: No


Condition: Good


Additional Instructions: 


Please continue to take seizure medication as prescribed.  Do not drive motor 

vehicles or operate heavy missionary for the next 6 months.  Avoid consumption 

of alcohol, tobacco and smoke products.  Follow-up with an outpatient primary 

care doctor or neurologist within the next 2 weeks.





Please return to the emergency room right away with new pain, worsened pain, 

migration of pain, projectile vomiting, change in mental status, confusion, 

inability tolerate liquid feeds, new, worsened or different symptoms not present

 on the initial emergency room evaluation


Referrals: 


[facility] [Provider Group] - 3-5 Days


[facility] [Provider Group] - 3-5 Days

## 2022-08-26 NOTE — ELECTROCARDIOGRAPH REPORT
[facility]

                                       

Test Date:    2022               Test Time:    20:39:19

Pat Name:     DANELLE RICHARDSON           Department:   

Patient ID:   [id number]          Room:          

Gender:       M                        Technician:   

:          [id number]               Requested By: MEDHAT BOSS

Order Number: [id number]             Reading MD:   Harsh Bey

                                 Measurements

Intervals                              Axis          

Rate:         50                       P:            55

SD:           163                      QRS:          46

QRSD:         94                       T:            21

QT:           411                                    

QTc:          376                                    

                           Interpretive Statements

Sinus bradycardia

ST elev, probable normal early repol pattern

Compared to ECG 2022 20:33:36

ST (T wave) deviation now present

Sinus rhythm no longer present

Electronically Signed On 2022 10:39:25 EDT by Harsh Bey

## 2022-09-10 ENCOUNTER — HOSPITAL ENCOUNTER (EMERGENCY)
Dept: HOSPITAL 5 - ED | Age: 42
Discharge: HOME | End: 2022-09-10
Payer: MEDICAID

## 2022-09-10 VITALS — SYSTOLIC BLOOD PRESSURE: 110 MMHG | DIASTOLIC BLOOD PRESSURE: 73 MMHG

## 2022-09-10 DIAGNOSIS — R56.9: ICD-10-CM

## 2022-09-10 DIAGNOSIS — E11.9: ICD-10-CM

## 2022-09-10 DIAGNOSIS — R05.9: ICD-10-CM

## 2022-09-10 DIAGNOSIS — J06.9: Primary | ICD-10-CM

## 2022-09-10 PROCEDURE — 99282 EMERGENCY DEPT VISIT SF MDM: CPT

## 2022-09-10 NOTE — EMERGENCY DEPARTMENT REPORT
- General


Chief Complaint: Upper Respiratory Infection


Stated Complaint: BAD COLD


Time Seen by Provider: 09/10/22 18:40


Source: patient


Mode of arrival: Ambulatory


Limitations: No Limitations





- History of Present Illness


Initial Comments: 





41-year-old male presents emerged [place] complaining of cough congestion coryza

over the last 3 to 4 days of unknown etiology.  Reports no hemoptysis no 

hematemesis hematochezia, no chest pain, no palpitations.  No nausea or 

vomiting.


MD Complaint: cough, rhinorrhea, nasal congestion, sinus pain


-: Gradual


Severity: mild, moderate


Quality: aching


Consistency: constant


Improves With: nothing


Worsens With: nothing


Associated Symptoms: rhinorrhea, nasal congestion, cough.  denies: myalgias, 

diaphoresis, shortness of breath, abdominal pain, nausea, confusion, weight 

loss, epistaxis, ear pain





- Related Data


                                Home Medications











 Medication  Instructions  Recorded  Confirmed  Last Taken


 


risperiDONE [RisperDAL] 1 mg PO QHS 03/29/18 12/28/21 Unknown








                                  Previous Rx's











 Medication  Instructions  Recorded  Last Taken  Type


 


cephALEXin [Keflex] 500 mg PO Q12HR #14 cap 12/29/21 Unknown Rx


 


risperiDONE [RisperDAL] 2 mg PO QHS #30 02/12/22 Unknown Rx


 


risperiDONE [RisperDAL] 2 mg PO QHS 30 Days #30 04/20/22 Unknown Rx


 


levETIRAcetam [Keppra TAB] 500 mg PO BID #60 tablet 08/25/22 Unknown Rx


 


Benzonatate [Tessalon Perles] 100 mg PO Q8HR #20 capsule 09/10/22 Unknown Rx


 


Loratadine/Pseudoephedrine 1 each PO Q12H #10 09/10/22 Unknown Rx





[Claritin-D 12Hr]    











                                    Allergies











Allergy/AdvReac Type Severity Reaction Status Date / Time


 


No Known Allergies Allergy   Verified 08/25/22 18:23














ED Review of Systems


ROS: 


Stated complaint: BAD COLD


Other details as noted in HPI





Comment: All other systems reviewed and negative





ED Past Medical Hx





- Past Medical History


Previous Medical History?: Yes


Hx Diabetes: Yes


Hx Seizures: Yes


Hx Psychiatric Treatment: Yes (bipolar/schizo)





- Surgical History


Past Surgical History?: No





- Social History


Smoking Status: Never Smoker


Substance Use Type: None (Denies illicit drug use)





- Medications


Home Medications: 


                                Home Medications











 Medication  Instructions  Recorded  Confirmed  Last Taken  Type


 


risperiDONE [RisperDAL] 1 mg PO QHS 03/29/18 12/28/21 Unknown History


 


cephALEXin [Keflex] 500 mg PO Q12HR #14 cap 12/29/21  Unknown Rx


 


risperiDONE [RisperDAL] 2 mg PO QHS #30 02/12/22  Unknown Rx


 


risperiDONE [RisperDAL] 2 mg PO QHS 30 Days #30 04/20/22  Unknown Rx


 


levETIRAcetam [Keppra TAB] 500 mg PO BID #60 tablet 08/25/22  Unknown Rx


 


Benzonatate [Tessalon Perles] 100 mg PO Q8HR #20 capsule 09/10/22  Unknown Rx


 


Loratadine/Pseudoephedrine 1 each PO Q12H #10 09/10/22  Unknown Rx





[Claritin-D 12Hr]     














ED Physical Exam





- General


Limitations: No Limitations


General appearance: alert, in no apparent distress





- Head


Head exam: Present: atraumatic, normocephalic





- Eye


Eye exam: Present: normal appearance, PERRL, EOMI





- ENT


ENT exam: Present: mucous membranes moist, other (Nasal congestion bilaterally 

with yellow-green mucus posterior nasal drainage is appreciated pharynx has mild

erythema but no exudate.  No lymphadenopathy is appreciated.)





- Neck


Neck exam: Present: normal inspection, full ROM.  Absent: meningismus, 

lymphadenopathy





- Respiratory


Respiratory exam: Present: normal lung sounds bilaterally, rhonchi (Scattered 

rhonchi).  Absent: respiratory distress, wheezes, rales, chest wall tenderness, 

accessory muscle use, decreased breath sounds, prolonged expiratory





- Cardiovascular


Cardiovascular Exam: Present: regular rate, normal rhythm.  Absent: systolic 

murmur, diastolic murmur, rubs, gallop





- GI/Abdominal


GI/Abdominal exam: Present: soft, normal bowel sounds





- Rectal


Rectal exam: Present: deferred





- Extremities Exam


Extremities exam: Present: normal inspection





- Back Exam


Back exam: Present: normal inspection





- Neurological Exam


Neurological exam: Present: alert, oriented X3





- Psychiatric


Psychiatric exam: Present: normal affect, normal mood





- Skin


Skin exam: Present: warm, dry, intact, normal color.  Absent: rash





ED Course


                                   Vital Signs











  09/10/22





  16:47


 


Temperature 99.1 F


 


Pulse Rate 91 H


 


Respiratory 20





Rate 


 


Blood Pressure 110/73





[Right] 


 


O2 Sat by Pulse 97





Oximetry 














ED Medical Decision Making





- Medical Decision Making





This patient presents with acute cough, most consistent with bronchitis. 

Differential diagnosis includes bronchitis/URI. Presentation not consistent with

 acute bacterial pneumonia, influenza, asthma, transient airway 

hyperresponsiveness. Presentation not consistent with chronic causes of cough 

(including GERD, asthma, postnasal discharge, medication side effect, CHF, lung 

cancer or mass).





Plan: , supportive care, reassess





This 41-year-old patient presents with symptoms suspicious for likely viral 

upper respiratory tract infection.  Differential includes bacterial pneumonia, 

sinusitis, allergic rhinitis, bronchitis, COVID-19.  Do not suspect underlying 

Cardiopulmonary process.  I considered but think unlikely dangerous cause of 

this patient symptoms to include acute coronary syndrome, CHF or COPD 

exacerbations, pneumonia, pneumothorax.  Patient is nontoxic appearing and not 

in need of emergent medical intervention.





Plan: Reassurance, reassessment, over-the-counter medications, discharge with 

PCP follow-up


Critical care attestation.: 


If time is entered above; I have spent that time in minutes in the direct care 

of this critically ill patient, excluding procedure time.








ED Disposition


Clinical Impression: 


 Cough, URI (upper respiratory infection)





Disposition: 01 HOME / SELF CARE / HOMELESS


Is pt being admited?: No


Does the pt Need Aspirin: No


Condition: Stable


Instructions:  Cool Mist Vaporizer, Upper Respiratory Infection, Adult, Cough, 

Adult


Prescriptions: 


Loratadine/Pseudoephedrine [Claritin-D 12Hr] 1 each PO Q12H #10


Benzonatate [Tessalon Perles] 100 mg PO Q8HR #20 capsule


Referrals: 


BAYLEE BARROS MD [Primary Care Provider] - 3-5 Days

## 2022-09-12 NOTE — CONSULTATION
History of Present Illness





- Reason for Consult


Consult date: 12/29/21


Reason for consult: mental health evaluation





- History of Present Psychiatric Illness


 ED Note: 41-year-old -American male with past medical history of bipolar

and schizophrenia, brought to the emergency room by EMS for evaluation of 

suicidal ideation.  Patient reported his symptoms started about 2 hours ago 

prior to come to the emergency room.  Patient stated he wants to live by himself

he is tired of living with people, as a result he wanted to harm himself.  He 

has plans to cut himself with a knife.  Patient reported he is currently taking 

respite all to treat his bipolar and schizophrenia.  He has a follow-up 

appointment with his psychiatrist on 12/30/2021.  However patient reported he is

actively suicidal.





The patient is a 41 year old male with history of Bipolar, Schizophrenia. In my 

interview with the patient, he is calm, alert and oriented x2. The reports that 

he currently lives with his cousin stating " I'm tired of living with people, 

laying on the floor and roaches crawling on me." The patient denies any current 

suicidal/homicidal ideation, he admits having auditory hallucination " voices 

telling me to go ahead and kill myself. 





 PSYCHIATRIC HISTORY:


Diagnoses: Bipolar, Schizophrenia


Suicide attempts or Self-harm behavior: Yes


Prior psychiatric hospitalizations: Yes


Substance Abuse history: Denies


Previous psychiatric medications tried: Risperidone


Outpatient treatment: [facility]





PAST MEDICAL HISTORY: None reported or document





Family Psychiatric History: None reported or documented





SOCIAL HISTORY


Marital Status: Single 


Living Arrangements: with cousin


Employment Status: Disabled


Access to guns/weapons: Denies


Education: 9th


History of Abuse: Denies


Legal History: unknown





REVIEW OF SYSTEMS  


Constitutional: Negative for weight loss


ENT: Negative for stridor


Respiratory: Negative for cough or hemoptysis


All other systems reviewed and are negative





MENTAL STATUS EXAMINATION


General Appearance and Behavior: Age appropriate, good hygiene, wearing 

appropriate clothes. calm, cooperative


Cooperation: Cooperative


Psychomotor Behavior: Psychomotor normal


Mood: "depressed"


Affect and affective range: Congruent with stated mood


Thought Process: goal directed


Thought Content: reality oriented


Speech: normal tone and pace


Suicidal Ideation: Denies


Homicidal Ideation:Denies


Hallucinations: Auditory


Delusions: None elicited


Impulse Control: limited


Insight and Judgment: Limited


Memory: Limited


Attention: attentive


Orientation: a/o x 2





 Assessment 


(1) Schizophrenia


Current Visit: Yes   Status: Acute





Treatment Plan


d/c 1013


Case management


The patient to fill and comply with previous medication regimen 


 consult


Risks, benefits and alternatives of medications discussed with the patient, 

questions answered and consent obtained from patient.


PSYCHOTHERAPY: Supportive psychotherapy provided


MEDICAL: Per primary team


DELIRIUM PRECAUTIONS: Please re-orient patient frequently, keep lights on during

the day, and minimize benzodiazepines and opiates as these medications could 

worsen patient's confusion.


SAFETY SITTER: Defer to primary


DISPOSITION: Do not recommend acute inpatient psychiatric hospitalization at 

this time. The patient understands that if SI/HI or any fear of endangerment for

herself or others she is to seek immediate assistance


The patient to follow up with outpatient psych in 7 to 14 days upon safety plan


The  to give the patient resources for group homes. 


FOLLOW-UP: Will sign off. Thanks


Case staffed with Dr. Boudreaux








Medications and Allergies





Medications and Allergies


                                    Allergies











Allergy/AdvReac Type Severity Reaction Status Date / Time


 


No Known Allergies Allergy   Verified 10/01/17 22:30











                                Home Medications











 Medication  Instructions  Recorded  Confirmed  Last Taken  Type


 


risperiDONE [RisperDAL] 1 mg PO QHS 03/29/18 12/28/21 Unknown History














Mental Status Exam





- Vital signs


                                Last Vital Signs











Temp  98.7 F   12/28/21 19:57


 


Pulse  79   12/28/21 19:57


 


Resp  18   12/28/21 19:57


 


BP  163/98   12/28/21 19:57


 


Pulse Ox  96   12/28/21 19:57














Results


Result Diagrams: 


                                 12/28/21 13:40





                                 12/28/21 13:40


                              Abnormal lab results











  12/28/21 12/28/21 12/28/21 Range/Units





  13:40 13:40 13:40 


 


WBC  3.8 L    (4.5-11.0)  K/mm3


 


Lymph % (Auto)  53.6 H    (13.4-35.0)  %


 


Seg Neutrophils %  38.1 L    (40.0-70.0)  %


 


Seg Neutrophils #  1.5 L    (1.8-7.7)  K/mm3


 


Carbon Dioxide     (22-30)  mmol/L


 


BUN     (9-20)  mg/dL


 


Creatinine     (0.8-1.3)  mg/dL


 


Glucose     ()  mg/dL


 


ALT     (7-56)  units/L


 


Urine WBC (Auto)     (0.0-6.0)  /HPF


 


Salicylates   < 0.3 L   (2.8-20.0)  mg/dL


 


Acetaminophen    5.0 L  (10.0-30.0)  ug/mL














  12/28/21 12/28/21 Range/Units





  13:40 18:07 


 


WBC    (4.5-11.0)  K/mm3


 


Lymph % (Auto)    (13.4-35.0)  %


 


Seg Neutrophils %    (40.0-70.0)  %


 


Seg Neutrophils #    (1.8-7.7)  K/mm3


 


Carbon Dioxide  21 L   (22-30)  mmol/L


 


BUN  6 L   (9-20)  mg/dL


 


Creatinine  0.6 L   (0.8-1.3)  mg/dL


 


Glucose  207 H   ()  mg/dL


 


ALT  63 H   (7-56)  units/L


 


Urine WBC (Auto)   10.0 H  (0.0-6.0)  /HPF


 


Salicylates    (2.8-20.0)  mg/dL


 


Acetaminophen    (10.0-30.0)  ug/mL








All other labs normal.
Erlin Salcedo